# Patient Record
Sex: FEMALE | Race: WHITE | Employment: FULL TIME | URBAN - METROPOLITAN AREA
[De-identification: names, ages, dates, MRNs, and addresses within clinical notes are randomized per-mention and may not be internally consistent; named-entity substitution may affect disease eponyms.]

---

## 2017-09-18 ENCOUNTER — HOSPITAL ENCOUNTER (EMERGENCY)
Age: 42
Discharge: HOME | End: 2017-09-18
Attending: EMERGENCY MEDICINE
Payer: COMMERCIAL

## 2017-09-18 VITALS
BODY MASS INDEX: 40.04 KG/M2 | DIASTOLIC BLOOD PRESSURE: 88 MMHG | OXYGEN SATURATION: 97 % | HEART RATE: 116 BPM | RESPIRATION RATE: 16 BRPM | SYSTOLIC BLOOD PRESSURE: 153 MMHG | HEIGHT: 63 IN | WEIGHT: 226 LBS | TEMPERATURE: 99.4 F

## 2017-09-18 DIAGNOSIS — B34.9 VIRAL ILLNESS: Primary | ICD-10-CM

## 2017-09-18 LAB
ALBUMIN SERPL-MCNC: 3.6 G/DL (ref 3.5–5)
ALBUMIN SERPL-MCNC: 3.7 G/DL (ref 3.5–5)
ALBUMIN/GLOB SERPL: 0.8 {RATIO} (ref 1.1–2.2)
ALBUMIN/GLOB SERPL: 0.9 {RATIO} (ref 1.1–2.2)
ALP SERPL-CCNC: 80 U/L (ref 45–117)
ALP SERPL-CCNC: 86 U/L (ref 45–117)
ALT SERPL-CCNC: 22 U/L (ref 12–78)
ALT SERPL-CCNC: 25 U/L (ref 12–78)
ANION GAP SERPL CALC-SCNC: 6 MMOL/L (ref 5–15)
ANION GAP SERPL CALC-SCNC: 9 MMOL/L (ref 5–15)
APPEARANCE UR: CLEAR
AST SERPL-CCNC: 16 U/L (ref 15–37)
AST SERPL-CCNC: ABNORMAL U/L (ref 15–37)
BACTERIA URNS QL MICRO: ABNORMAL /HPF
BASOPHILS # BLD: 0 K/UL (ref 0–0.1)
BASOPHILS NFR BLD: 0 % (ref 0–1)
BILIRUB SERPL-MCNC: 0.5 MG/DL (ref 0.2–1)
BILIRUB SERPL-MCNC: 0.6 MG/DL (ref 0.2–1)
BILIRUB UR QL: NEGATIVE
BUN SERPL-MCNC: 7 MG/DL (ref 6–20)
BUN SERPL-MCNC: 8 MG/DL (ref 6–20)
BUN/CREAT SERPL: 11 (ref 12–20)
BUN/CREAT SERPL: 9 (ref 12–20)
CALCIUM SERPL-MCNC: 9.1 MG/DL (ref 8.5–10.1)
CALCIUM SERPL-MCNC: 9.5 MG/DL (ref 8.5–10.1)
CHLORIDE SERPL-SCNC: 100 MMOL/L (ref 97–108)
CHLORIDE SERPL-SCNC: 103 MMOL/L (ref 97–108)
CO2 SERPL-SCNC: 24 MMOL/L (ref 21–32)
CO2 SERPL-SCNC: 27 MMOL/L (ref 21–32)
COLOR UR: ABNORMAL
CREAT SERPL-MCNC: 0.76 MG/DL (ref 0.55–1.02)
CREAT SERPL-MCNC: 0.8 MG/DL (ref 0.55–1.02)
DEPRECATED S PYO AG THROAT QL EIA: NEGATIVE
EOSINOPHIL # BLD: 0.2 K/UL (ref 0–0.4)
EOSINOPHIL NFR BLD: 1 % (ref 0–7)
EPITH CASTS URNS QL MICRO: ABNORMAL /LPF
ERYTHROCYTE [DISTWIDTH] IN BLOOD BY AUTOMATED COUNT: 13.4 % (ref 11.5–14.5)
FLUAV AG NPH QL IA: NEGATIVE
FLUBV AG NOSE QL IA: NEGATIVE
GLOBULIN SER CALC-MCNC: 4 G/DL (ref 2–4)
GLOBULIN SER CALC-MCNC: 4.6 G/DL (ref 2–4)
GLUCOSE BLD STRIP.AUTO-MCNC: 174 MG/DL (ref 65–100)
GLUCOSE SERPL-MCNC: 178 MG/DL (ref 65–100)
GLUCOSE SERPL-MCNC: 178 MG/DL (ref 65–100)
GLUCOSE UR STRIP.AUTO-MCNC: >1000 MG/DL
HCT VFR BLD AUTO: 36.8 % (ref 35–47)
HGB BLD-MCNC: 12.9 G/DL (ref 11.5–16)
HGB UR QL STRIP: NEGATIVE
HYALINE CASTS URNS QL MICRO: ABNORMAL /LPF (ref 0–5)
KETONES UR QL STRIP.AUTO: NEGATIVE MG/DL
LEUKOCYTE ESTERASE UR QL STRIP.AUTO: NEGATIVE
LYMPHOCYTES # BLD: 2.1 K/UL (ref 0.8–3.5)
LYMPHOCYTES NFR BLD: 15 % (ref 12–49)
MCH RBC QN AUTO: 28.8 PG (ref 26–34)
MCHC RBC AUTO-ENTMCNC: 35.1 G/DL (ref 30–36.5)
MCV RBC AUTO: 82.1 FL (ref 80–99)
MONOCYTES # BLD: 0.8 K/UL (ref 0–1)
MONOCYTES NFR BLD: 6 % (ref 5–13)
NEUTS SEG # BLD: 11.1 K/UL (ref 1.8–8)
NEUTS SEG NFR BLD: 78 % (ref 32–75)
NITRITE UR QL STRIP.AUTO: NEGATIVE
PH UR STRIP: 5.5 [PH] (ref 5–8)
PLATELET # BLD AUTO: 227 K/UL (ref 150–400)
POTASSIUM SERPL-SCNC: 3.5 MMOL/L (ref 3.5–5.1)
POTASSIUM SERPL-SCNC: ABNORMAL MMOL/L (ref 3.5–5.1)
PROT SERPL-MCNC: 7.6 G/DL (ref 6.4–8.2)
PROT SERPL-MCNC: 8.3 G/DL (ref 6.4–8.2)
PROT UR STRIP-MCNC: NEGATIVE MG/DL
RBC # BLD AUTO: 4.48 M/UL (ref 3.8–5.2)
RBC #/AREA URNS HPF: ABNORMAL /HPF (ref 0–5)
SERVICE CMNT-IMP: ABNORMAL
SODIUM SERPL-SCNC: 133 MMOL/L (ref 136–145)
SODIUM SERPL-SCNC: 136 MMOL/L (ref 136–145)
SP GR UR REFRACTOMETRY: 1.02 (ref 1–1.03)
UROBILINOGEN UR QL STRIP.AUTO: 0.2 EU/DL (ref 0.2–1)
WBC # BLD AUTO: 14.2 K/UL (ref 3.6–11)
WBC URNS QL MICRO: ABNORMAL /HPF (ref 0–4)

## 2017-09-18 PROCEDURE — 36415 COLL VENOUS BLD VENIPUNCTURE: CPT | Performed by: PHYSICIAN ASSISTANT

## 2017-09-18 PROCEDURE — 80053 COMPREHEN METABOLIC PANEL: CPT | Performed by: EMERGENCY MEDICINE

## 2017-09-18 PROCEDURE — 87880 STREP A ASSAY W/OPTIC: CPT | Performed by: PHYSICIAN ASSISTANT

## 2017-09-18 PROCEDURE — 87070 CULTURE OTHR SPECIMN AEROBIC: CPT | Performed by: EMERGENCY MEDICINE

## 2017-09-18 PROCEDURE — 85025 COMPLETE CBC W/AUTO DIFF WBC: CPT | Performed by: EMERGENCY MEDICINE

## 2017-09-18 PROCEDURE — 96374 THER/PROPH/DIAG INJ IV PUSH: CPT

## 2017-09-18 PROCEDURE — 82962 GLUCOSE BLOOD TEST: CPT

## 2017-09-18 PROCEDURE — 87147 CULTURE TYPE IMMUNOLOGIC: CPT | Performed by: EMERGENCY MEDICINE

## 2017-09-18 PROCEDURE — 87804 INFLUENZA ASSAY W/OPTIC: CPT | Performed by: PHYSICIAN ASSISTANT

## 2017-09-18 PROCEDURE — 96361 HYDRATE IV INFUSION ADD-ON: CPT

## 2017-09-18 PROCEDURE — 74011250636 HC RX REV CODE- 250/636: Performed by: PHYSICIAN ASSISTANT

## 2017-09-18 PROCEDURE — 80053 COMPREHEN METABOLIC PANEL: CPT | Performed by: PHYSICIAN ASSISTANT

## 2017-09-18 PROCEDURE — 81001 URINALYSIS AUTO W/SCOPE: CPT | Performed by: PHYSICIAN ASSISTANT

## 2017-09-18 PROCEDURE — 99284 EMERGENCY DEPT VISIT MOD MDM: CPT

## 2017-09-18 RX ORDER — KETOROLAC TROMETHAMINE 30 MG/ML
30 INJECTION, SOLUTION INTRAMUSCULAR; INTRAVENOUS
Status: COMPLETED | OUTPATIENT
Start: 2017-09-18 | End: 2017-09-18

## 2017-09-18 RX ADMIN — SODIUM CHLORIDE 1000 ML: 900 INJECTION, SOLUTION INTRAVENOUS at 17:51

## 2017-09-18 RX ADMIN — KETOROLAC TROMETHAMINE 30 MG: 30 INJECTION, SOLUTION INTRAMUSCULAR at 17:51

## 2017-09-18 NOTE — ED PROVIDER NOTES
HPI Comments: Jorden Rice is a 38 y.o. female  who presents by private vehicle to ER with c/o Patient presents with:  Sore Throat  Chills  Patient presents with sore throat, fever and diarrhea starting Saturday. Patient has been taking tylenol regularly and nyquil. Patient reports mild cough. Denies any sick contacts. She specifically denies any  nausea, vomiting, chest pain, shortness of breath, headache, rash, diarrhea, abdominal pain, urinary/bowel changes, sweating or weight loss. PCP: Palak Mejias MD   PMHx significant for: Past Medical History:  3/19/2012: Gestational diabetes  No date: Headache  12/14/2012: History of gestational diabetes  12/14/2012: HTN (hypertension)  12/14/2012: Obesity  No date: Ovarian cyst   PSHx significant for: Past Surgical History:  No date: APPENDECTOMY  No date: HX CHOLECYSTECTOMY  No date: HX TUBAL LIGATION  Social Hx: Tobacco use: Smoking status: Never Smoker                                                              Smokeless status: Never Used                      ; EtOH use: The patient states she drinks ocassionally per week.; Illicit Drug use: Allergies:  No Known Allergies    There are no other complaints, changes or physical findings at this time. Patient is a 38 y.o. female presenting with sore throat and chills. The history is provided by the patient. Sore Throat    This is a new problem. The current episode started 2 days ago. The problem has not changed since onset. Patient reports a subjective fever - was not measured. Associated symptoms include cough. She has had no exposure to strep or mono. She has tried acetaminophen for the symptoms. The treatment provided mild relief. Chills    Associated symptoms include sore throat and cough.         Past Medical History:   Diagnosis Date    Gestational diabetes 3/19/2012    Headache     History of gestational diabetes 12/14/2012    HTN (hypertension) 12/14/2012    Obesity 12/14/2012    Ovarian cyst        Past Surgical History:   Procedure Laterality Date    APPENDECTOMY      HX CHOLECYSTECTOMY      HX TUBAL LIGATION           Family History:   Problem Relation Age of Onset    Hypertension Mother     Diabetes Mother     Diabetes Daughter      type I    COPD Father        Social History     Social History    Marital status: SINGLE     Spouse name: N/A    Number of children: N/A    Years of education: N/A     Occupational History    Not on file. Social History Main Topics    Smoking status: Never Smoker    Smokeless tobacco: Never Used    Alcohol use Yes      Comment: ocassional    Drug use: No    Sexual activity: Yes     Other Topics Concern    Not on file     Social History Narrative    No narrative on file         ALLERGIES: Review of patient's allergies indicates no known allergies. Review of Systems   Constitutional: Positive for chills. HENT: Positive for sore throat. Eyes: Negative. Respiratory: Positive for cough. Cardiovascular: Negative. Gastrointestinal: Negative. Endocrine: Negative. Genitourinary: Negative. Musculoskeletal: Negative. Skin: Negative. Allergic/Immunologic: Negative. Neurological: Negative. Hematological: Negative. Psychiatric/Behavioral: Negative. All other systems reviewed and are negative. Vitals:    09/18/17 1715   BP: 189/90   Pulse: (!) 124   Resp: 20   Temp: 99.4 °F (37.4 °C)   SpO2: 97%   Weight: 102.5 kg (226 lb)   Height: 5' 3\" (1.6 m)            Physical Exam   Constitutional: She is oriented to person, place, and time. She appears well-developed and well-nourished. Non-toxic appearance. She does not have a sickly appearance. She does not appear ill. No distress. Patient is well appearing and in no acute distress. Patient is non-toxic appearing. Patient ambulated to exam room with out difficulty. HENT:   Head: Normocephalic and atraumatic.    Right Ear: Hearing, tympanic membrane, external ear and ear canal normal.   Left Ear: Hearing, tympanic membrane, external ear and ear canal normal.   Nose: No rhinorrhea. Mouth/Throat: Posterior oropharyngeal erythema present. No oropharyngeal exudate. Eyes: Conjunctivae and EOM are normal. Pupils are equal, round, and reactive to light. Right eye exhibits no discharge. Left eye exhibits no discharge. No scleral icterus. Neck: Normal range of motion. No tracheal deviation present. No thyromegaly present. Cardiovascular: Regular rhythm and normal heart sounds. Tachycardia present. No murmur heard. Pulmonary/Chest: Effort normal and breath sounds normal. No respiratory distress. She has no decreased breath sounds. She has no wheezes. She has no rales. She exhibits no tenderness. Abdominal: Soft. Bowel sounds are normal. She exhibits no distension. There is no tenderness. There is no rebound and no guarding. Musculoskeletal: Normal range of motion. She exhibits no edema or tenderness. Lymphadenopathy:     She has no cervical adenopathy. Neurological: She is alert and oriented to person, place, and time. No cranial nerve deficit. Coordination normal.   Skin: Skin is warm. No erythema. Psychiatric: She has a normal mood and affect. Her behavior is normal. Judgment and thought content normal.   Nursing note and vitals reviewed. MDM  Number of Diagnoses or Management Options  Viral illness:   Diagnosis management comments: Assesment/Plan- 38year old female with sore throat. Differential includes strep, flu, viral illness. Labs reviewed with leukocytosis. Negative rapid strep and rapid flu. Patient given IVF in ED and feeling some improvement. Patient discharged with PCP follow up. Patient educated on reasons to return to the ED.        Amount and/or Complexity of Data Reviewed  Clinical lab tests: ordered and reviewed  Tests in the medicine section of CPT®: ordered and reviewed  Discuss the patient with other providers: yes (Attending- Dr. Alberto Soria also saw patient and agrees with plan. )      ED Course       Procedures

## 2017-09-18 NOTE — DISCHARGE INSTRUCTIONS

## 2017-09-20 LAB
BACTERIA SPEC CULT: ABNORMAL
BACTERIA SPEC CULT: ABNORMAL
SERVICE CMNT-IMP: ABNORMAL

## 2017-09-20 RX ORDER — CEFDINIR 300 MG/1
300 CAPSULE ORAL 2 TIMES DAILY
Qty: 20 CAP | Refills: 0 | Status: SHIPPED | OUTPATIENT
Start: 2017-09-20

## 2019-02-21 ENCOUNTER — HOSPITAL ENCOUNTER (EMERGENCY)
Age: 44
Discharge: HOME | End: 2019-02-21
Attending: EMERGENCY MEDICINE
Payer: COMMERCIAL

## 2019-02-21 ENCOUNTER — APPOINTMENT (OUTPATIENT)
Dept: GENERAL RADIOLOGY | Age: 44
End: 2019-02-21
Attending: EMERGENCY MEDICINE
Payer: COMMERCIAL

## 2019-02-21 VITALS
WEIGHT: 226 LBS | HEART RATE: 103 BPM | DIASTOLIC BLOOD PRESSURE: 106 MMHG | RESPIRATION RATE: 14 BRPM | BODY MASS INDEX: 40.03 KG/M2 | OXYGEN SATURATION: 97 % | TEMPERATURE: 98.8 F | SYSTOLIC BLOOD PRESSURE: 183 MMHG

## 2019-02-21 DIAGNOSIS — M25.571 ACUTE RIGHT ANKLE PAIN: Primary | ICD-10-CM

## 2019-02-21 PROCEDURE — 74011250637 HC RX REV CODE- 250/637: Performed by: EMERGENCY MEDICINE

## 2019-02-21 PROCEDURE — 73610 X-RAY EXAM OF ANKLE: CPT

## 2019-02-21 PROCEDURE — 99283 EMERGENCY DEPT VISIT LOW MDM: CPT

## 2019-02-21 RX ORDER — NAPROXEN 500 MG/1
500 TABLET ORAL 2 TIMES DAILY WITH MEALS
Qty: 20 TAB | Refills: 0 | Status: SHIPPED | OUTPATIENT
Start: 2019-02-21 | End: 2019-03-03

## 2019-02-21 RX ORDER — HYDROCODONE BITARTRATE AND ACETAMINOPHEN 5; 325 MG/1; MG/1
1 TABLET ORAL
Qty: 6 TAB | Refills: 0 | Status: SHIPPED | OUTPATIENT
Start: 2019-02-21

## 2019-02-21 RX ORDER — NAPROXEN 250 MG/1
500 TABLET ORAL
Status: COMPLETED | OUTPATIENT
Start: 2019-02-21 | End: 2019-02-21

## 2019-02-21 RX ADMIN — NAPROXEN 500 MG: 250 TABLET ORAL at 18:30

## 2019-02-21 NOTE — ED PROVIDER NOTES
53 y.o. female with past medical history significant for HTN, obesity, gestational DM, ovarian cyst, who presents ambulatory to the ED with cc of ankle pain. Pt reports she woke with 10/10 right ankle pain yesterday morning. She states she is ambulatory with pain. She endorses use of Advil 800mg at ~9:00AM. Pt denies known injury or fall. She denies history of kidney problems. She specifically denies any redness, ankle swelling, leg swelling, appetite change, diet change, fevers, chills, nausea, vomiting, urinary changes, bowel movement changes. There are no other acute medical concerns at this time. Social Hx: Never Tobacco use, occasional EtOH use, denies Illicit Drug use PCP: Es Andrea MD 
 
Note written by Maisha Maier, as dictated by Meenu Sims MD 6:24 PM 
 
 
 
The history is provided by the patient. No  was used. Past Medical History:  
Diagnosis Date  Gestational diabetes 3/19/2012  Headache  History of gestational diabetes 12/14/2012  
 HTN (hypertension) 12/14/2012  Obesity 12/14/2012  Ovarian cyst   
 
 
Past Surgical History:  
Procedure Laterality Date  APPENDECTOMY  HX CHOLECYSTECTOMY  HX TUBAL LIGATION Family History:  
Problem Relation Age of Onset  Hypertension Mother  Diabetes Mother  Diabetes Daughter   
     type I  
 COPD Father Social History Socioeconomic History  Marital status: SINGLE Spouse name: Not on file  Number of children: Not on file  Years of education: Not on file  Highest education level: Not on file Social Needs  Financial resource strain: Not on file  Food insecurity - worry: Not on file  Food insecurity - inability: Not on file  Transportation needs - medical: Not on file  Transportation needs - non-medical: Not on file Occupational History  Not on file Tobacco Use  Smoking status: Never Smoker  Smokeless tobacco: Never Used Substance and Sexual Activity  Alcohol use: Yes Comment: ocassional  
 Drug use: No  
 Sexual activity: Yes Other Topics Concern  Not on file Social History Narrative  Not on file ALLERGIES: Patient has no known allergies. Review of Systems Constitutional: Negative for appetite change, chills and fever. HENT: Negative for ear pain and sore throat. Eyes: Negative for pain. Respiratory: Negative for chest tightness and shortness of breath. Cardiovascular: Negative for chest pain and leg swelling. Gastrointestinal: Negative for abdominal pain, blood in stool, constipation, diarrhea, nausea and vomiting. Genitourinary: Negative for decreased urine volume, difficulty urinating, dysuria, flank pain, frequency, hematuria and urgency. Musculoskeletal: Positive for arthralgias. Negative for back pain. Skin: Negative for color change and rash. Neurological: Negative for headaches. All other systems reviewed and are negative. Vitals:  
 02/21/19 1826 BP: (!) 183/106 Pulse: (!) 103 Resp: 14 Temp: 98.8 °F (37.1 °C) SpO2: 97% Weight: 102.5 kg (226 lb) Physical Exam  
Constitutional: No distress. Obese HENT:  
Head: Normocephalic and atraumatic. Mouth/Throat: Oropharynx is clear and moist.  
Eyes: Conjunctivae are normal. Pupils are equal, round, and reactive to light. No scleral icterus. Neck: Neck supple. No tracheal deviation present. Cardiovascular: Normal rate, regular rhythm and intact distal pulses. Pulses intact Pulmonary/Chest: Effort normal. No respiratory distress. She has no wheezes. She has no rales. Abdominal: Soft. She exhibits no distension. There is no tenderness. Genitourinary:  
Genitourinary Comments: deferred Musculoskeletal: She exhibits tenderness (with flexion and extension of right ankle). She exhibits no edema or deformity. no bony tenderness Neurological: She is alert. Normal motor and sensation Skin: Skin is warm and dry. Psychiatric: She has a normal mood and affect. Nursing note and vitals reviewed. Note written by Maisha Enriquez, as dictated by Chelsi Ngo MD 6:24 PM 
 
MDM Number of Diagnoses or Management Options Acute right ankle pain:  
Diagnosis management comments: 53 y.o. female with past medical history significant for obesity, htn presents with ankle pain Differential diagnosis includes gout, sprain, arthritis. No signs of celllulitis or septic arthritis  
- NSAIDS 
- Norco for breakthrough pain - Follow up with PCP 
- Given return precautions Rubens Horan. Tereso Pat MD 
 
 
  
 
Procedures

## 2019-02-22 NOTE — DISCHARGE INSTRUCTIONS

## 2023-01-15 ENCOUNTER — HOSPITAL ENCOUNTER (EMERGENCY)
Age: 48
Discharge: HOME | End: 2023-01-15
Attending: STUDENT IN AN ORGANIZED HEALTH CARE EDUCATION/TRAINING PROGRAM
Payer: COMMERCIAL

## 2023-01-15 ENCOUNTER — APPOINTMENT (OUTPATIENT)
Dept: CT IMAGING | Age: 48
End: 2023-01-15
Payer: COMMERCIAL

## 2023-01-15 VITALS
HEIGHT: 63 IN | WEIGHT: 220 LBS | RESPIRATION RATE: 16 BRPM | BODY MASS INDEX: 38.98 KG/M2 | OXYGEN SATURATION: 94 % | HEART RATE: 93 BPM | TEMPERATURE: 98.6 F | DIASTOLIC BLOOD PRESSURE: 82 MMHG | SYSTOLIC BLOOD PRESSURE: 148 MMHG

## 2023-01-15 DIAGNOSIS — K57.92 DIVERTICULITIS: Primary | ICD-10-CM

## 2023-01-15 DIAGNOSIS — N28.89 LEFT RENAL MASS: ICD-10-CM

## 2023-01-15 LAB
ALBUMIN SERPL-MCNC: 3.8 G/DL (ref 3.5–5)
ALBUMIN/GLOB SERPL: 0.9 (ref 1.1–2.2)
ALP SERPL-CCNC: 66 U/L (ref 45–117)
ALT SERPL-CCNC: 31 U/L (ref 12–78)
ANION GAP SERPL CALC-SCNC: 10 MMOL/L (ref 5–15)
APPEARANCE UR: CLEAR
AST SERPL-CCNC: 17 U/L (ref 15–37)
BACTERIA URNS QL MICRO: NEGATIVE /HPF
BASOPHILS # BLD: 0.1 K/UL (ref 0–0.1)
BASOPHILS NFR BLD: 0 % (ref 0–1)
BILIRUB SERPL-MCNC: 1.1 MG/DL (ref 0.2–1)
BILIRUB UR QL: NEGATIVE
BUN SERPL-MCNC: 10 MG/DL (ref 6–20)
BUN/CREAT SERPL: 10 (ref 12–20)
CALCIUM SERPL-MCNC: 9.5 MG/DL (ref 8.5–10.1)
CHLORIDE SERPL-SCNC: 101 MMOL/L (ref 97–108)
CO2 SERPL-SCNC: 25 MMOL/L (ref 21–32)
COLOR UR: ABNORMAL
COMMENT, HOLDF: NORMAL
CREAT SERPL-MCNC: 0.96 MG/DL (ref 0.55–1.02)
DIFFERENTIAL METHOD BLD: ABNORMAL
EOSINOPHIL # BLD: 0.2 K/UL (ref 0–0.4)
EOSINOPHIL NFR BLD: 1 % (ref 0–7)
EPITH CASTS URNS QL MICRO: ABNORMAL /LPF
ERYTHROCYTE [DISTWIDTH] IN BLOOD BY AUTOMATED COUNT: 13.3 % (ref 11.5–14.5)
GLOBULIN SER CALC-MCNC: 4.3 G/DL (ref 2–4)
GLUCOSE SERPL-MCNC: 247 MG/DL (ref 65–100)
GLUCOSE UR STRIP.AUTO-MCNC: 500 MG/DL
HCT VFR BLD AUTO: 40.9 % (ref 35–47)
HGB BLD-MCNC: 13.9 G/DL (ref 11.5–16)
HGB UR QL STRIP: NEGATIVE
IMM GRANULOCYTES # BLD AUTO: 0.1 K/UL (ref 0–0.04)
IMM GRANULOCYTES NFR BLD AUTO: 1 % (ref 0–0.5)
KETONES UR QL STRIP.AUTO: ABNORMAL MG/DL
LACTATE SERPL-SCNC: 1 MMOL/L (ref 0.4–2)
LEUKOCYTE ESTERASE UR QL STRIP.AUTO: ABNORMAL
LIPASE SERPL-CCNC: 267 U/L (ref 73–393)
LYMPHOCYTES # BLD: 3.2 K/UL (ref 0.8–3.5)
LYMPHOCYTES NFR BLD: 17 % (ref 12–49)
MCH RBC QN AUTO: 29 PG (ref 26–34)
MCHC RBC AUTO-ENTMCNC: 34 G/DL (ref 30–36.5)
MCV RBC AUTO: 85.4 FL (ref 80–99)
MONOCYTES # BLD: 0.8 K/UL (ref 0–1)
MONOCYTES NFR BLD: 4 % (ref 5–13)
NEUTS SEG # BLD: 14.4 K/UL (ref 1.8–8)
NEUTS SEG NFR BLD: 77 % (ref 32–75)
NITRITE UR QL STRIP.AUTO: NEGATIVE
NRBC # BLD: 0 K/UL (ref 0–0.01)
NRBC BLD-RTO: 0 PER 100 WBC
PH UR STRIP: 6 (ref 5–8)
PLATELET # BLD AUTO: 263 K/UL (ref 150–400)
PMV BLD AUTO: 10.2 FL (ref 8.9–12.9)
POTASSIUM SERPL-SCNC: 3.9 MMOL/L (ref 3.5–5.1)
PROT SERPL-MCNC: 8.1 G/DL (ref 6.4–8.2)
PROT UR STRIP-MCNC: ABNORMAL MG/DL
RBC # BLD AUTO: 4.79 M/UL (ref 3.8–5.2)
RBC #/AREA URNS HPF: ABNORMAL /HPF (ref 0–5)
SAMPLES BEING HELD,HOLD: NORMAL
SODIUM SERPL-SCNC: 136 MMOL/L (ref 136–145)
SP GR UR REFRACTOMETRY: 1.02 (ref 1–1.03)
UROBILINOGEN UR QL STRIP.AUTO: 0.2 EU/DL (ref 0.2–1)
WBC # BLD AUTO: 18.8 K/UL (ref 3.6–11)
WBC URNS QL MICRO: ABNORMAL /HPF (ref 0–4)

## 2023-01-15 PROCEDURE — 96375 TX/PRO/DX INJ NEW DRUG ADDON: CPT

## 2023-01-15 PROCEDURE — 85025 COMPLETE CBC W/AUTO DIFF WBC: CPT

## 2023-01-15 PROCEDURE — 36415 COLL VENOUS BLD VENIPUNCTURE: CPT

## 2023-01-15 PROCEDURE — 99285 EMERGENCY DEPT VISIT HI MDM: CPT

## 2023-01-15 PROCEDURE — 81001 URINALYSIS AUTO W/SCOPE: CPT

## 2023-01-15 PROCEDURE — 74011000258 HC RX REV CODE- 258

## 2023-01-15 PROCEDURE — 83690 ASSAY OF LIPASE: CPT

## 2023-01-15 PROCEDURE — 96365 THER/PROPH/DIAG IV INF INIT: CPT

## 2023-01-15 PROCEDURE — 87040 BLOOD CULTURE FOR BACTERIA: CPT

## 2023-01-15 PROCEDURE — 96361 HYDRATE IV INFUSION ADD-ON: CPT

## 2023-01-15 PROCEDURE — 74011250636 HC RX REV CODE- 250/636

## 2023-01-15 PROCEDURE — 83605 ASSAY OF LACTIC ACID: CPT

## 2023-01-15 PROCEDURE — 74011000636 HC RX REV CODE- 636: Performed by: RADIOLOGY

## 2023-01-15 PROCEDURE — 74177 CT ABD & PELVIS W/CONTRAST: CPT

## 2023-01-15 PROCEDURE — 80053 COMPREHEN METABOLIC PANEL: CPT

## 2023-01-15 RX ORDER — KETOROLAC TROMETHAMINE 30 MG/ML
30 INJECTION, SOLUTION INTRAMUSCULAR; INTRAVENOUS
Status: COMPLETED | OUTPATIENT
Start: 2023-01-15 | End: 2023-01-15

## 2023-01-15 RX ORDER — MORPHINE SULFATE 4 MG/ML
4 INJECTION INTRAVENOUS
Status: COMPLETED | OUTPATIENT
Start: 2023-01-15 | End: 2023-01-15

## 2023-01-15 RX ORDER — AMOXICILLIN AND CLAVULANATE POTASSIUM 875; 125 MG/1; MG/1
1 TABLET, FILM COATED ORAL 2 TIMES DAILY
Qty: 14 TABLET | Refills: 0 | Status: SHIPPED | OUTPATIENT
Start: 2023-01-15 | End: 2023-01-22

## 2023-01-15 RX ADMIN — KETOROLAC TROMETHAMINE 30 MG: 30 INJECTION, SOLUTION INTRAMUSCULAR at 10:31

## 2023-01-15 RX ADMIN — MORPHINE SULFATE 4 MG: 4 INJECTION INTRAVENOUS at 13:28

## 2023-01-15 RX ADMIN — SODIUM CHLORIDE 1000 ML: 9 INJECTION, SOLUTION INTRAVENOUS at 11:12

## 2023-01-15 RX ADMIN — PIPERACILLIN AND TAZOBACTAM 4.5 G: 4; .5 INJECTION, POWDER, LYOPHILIZED, FOR SOLUTION INTRAVENOUS at 12:59

## 2023-01-15 RX ADMIN — IOPAMIDOL 100 ML: 755 INJECTION, SOLUTION INTRAVENOUS at 11:01

## 2023-01-15 NOTE — Clinical Note
[address]  [facility] EMERGENCY DEPT  [id number] [phone]    Work/School Note    Date: 1/15/2023    To Whom It May concern:    Beverley Klein was seen and treated today in the emergency room by the following provider(s):  Attending Provider: Pantera Andrew MD  Nurse Practitioner: Dakota Rollins. Beverley Klein is excused from work/school on 01/15/23 and 01/16/23. She is medically clear to return to work/school on 1/17/2023.        Sincerely,          Boom Herrera RN

## 2023-01-15 NOTE — Clinical Note
[address]  [facility] EMERG[facility]Y DEPT  [address] [id number]  [phone]    Work/School Note    Date: 1/15/2023    To Whom It May concern:    Shahram Becerra was seen and treated today in the emergency room by the following provider(s):  Attending Provider: George Bravo MD  Nurse Practitioner: Tiffanie Zamora Shahram Becerra is excused from work/school on 01/15/23 and 01/16/23. She is medically clear to return to work/school on 1/17/2023.        Sincerely,          Trino Morales

## 2023-01-15 NOTE — DISCHARGE INSTRUCTIONS
Please come back to the emergency department if you are unable to keep down fluids, new fever, chills, vomiting blood, bloody stool, or any other concerns. Follow-up with your primary care provider in 2 days for the diverticulitis and the renal mass. Result of CT:     \"1. Mild proximal sigmoid diverticulitis. No associated fluid collection. 2. 2.1 cm x 2.7 cm inhomogeneous, but predominantly isodense mass within the  central left kidney. .. Recommend dedicated renal mass CT or MR to confirm  enhancement characteristics. \"    Take ibuprofen and tylenol as needed for pain.  Clear liquid diet to allow the intestines to heal.

## 2023-01-15 NOTE — ED TRIAGE NOTES
Pt reports lower abdominal with worsening pain to left lower abdomen. Pt also reports headache at this time. Last normal BM on 1/12/23 constipation over last several days. Pt denies nausea and vomiting. Pt denies vision changes with headache. Took tylenol for pain without relief. Denies urinary symptoms.

## 2023-01-15 NOTE — ED PROVIDER NOTES
Zaki Cope is a 50 y.o. female w/ hx ovarian cyst, total hysterectomy, HTN, migraine HA, appendectomy, and cholecystectomy presents to the ED c/o bilateral suprapubic pain since Thursday, Jan 12 th that increases w/ movement. Pt reports the left lower abdomen is worse than right. Pt reports the pain feels like her \"ovarian cysts\" w/ constant abdominal pain but also intermittent abdominal cramping. Pt reports pain \"feels like gas\". Pt reports taking Tums, pepto bismal, tylenol, ibuprofen, and ex-lax w/o relief. Pt also reports migraine headache that is similar to previous migraine headaches. Pt reports \"I haven't been able to sleep and I think all this is causing my headache\". Pt reports small BM today morning but had a complete relief BM on Saturday, Jan 14 th. Pt reports \"I'm only SOB from the pain\". When asked about elevated HR, pt reports normal HR is 120 s but has never been assessed for elevated HR. Denies dysuria, diarrhea, nausea, vomiting, recent sick contacts, back pain, chest pain, vaginal bleeding, vision changes. Admits to occasional ETOH use, denies illicit drug use, denies smoking, denies vape, denies IVDU. The history is provided by the patient. No  was used. Abdominal Pain   Associated symptoms include headaches. Pertinent negatives include no fever, no diarrhea, no nausea, no vomiting, no dysuria and no chest pain. Headache   Associated symptoms include shortness of breath. Pertinent negatives include no fever, no nausea and no vomiting.       Past Medical History:   Diagnosis Date    Gestational diabetes 3/19/2012    Headache(784.0)     History of gestational diabetes 12/14/2012    HTN (hypertension) 12/14/2012    Obesity 12/14/2012    Ovarian cyst        Past Surgical History:   Procedure Laterality Date    HX CHOLECYSTECTOMY      HX TUBAL LIGATION      AK APPENDECTOMY           Family History:   Problem Relation Age of Onset    Hypertension Mother Diabetes Mother     Diabetes Daughter         type I    COPD Father        Social History     Socioeconomic History    Marital status: SINGLE     Spouse name: Not on file    Number of children: Not on file    Years of education: Not on file    Highest education level: Not on file   Occupational History    Not on file   Tobacco Use    Smoking status: Never    Smokeless tobacco: Never   Substance and Sexual Activity    Alcohol use: Yes     Comment: ocassional    Drug use: No    Sexual activity: Yes   Other Topics Concern    Not on file   Social History Narrative    Not on file     Social Determinants of Health     Financial Resource Strain: Not on file   Food Insecurity: Not on file   Transportation Needs: Not on file   Physical Activity: Not on file   Stress: Not on file   Social Connections: Not on file   Intimate Partner Violence: Not on file   Housing Stability: Not on file         ALLERGIES: Patient has no known allergies. Review of Systems   Constitutional:  Negative for activity change, appetite change, chills and fever. Respiratory:  Positive for shortness of breath. Cardiovascular:  Negative for chest pain. Gastrointestinal:  Positive for abdominal pain. Negative for diarrhea, nausea and vomiting. Genitourinary:  Negative for dysuria, flank pain and vaginal pain. Skin:  Negative for rash. Neurological:  Positive for headaches. Vitals:    01/15/23 0944 01/15/23 1003   BP: (!) 181/80 (!) 145/92   Pulse: (!) 128    Resp: 16    Temp: 98.6 °F (37 °C)    SpO2: 96% 95%   Weight: 99.8 kg (220 lb)    Height: 5' 3\" (1.6 m)             Physical Exam  Vitals reviewed. Constitutional:       General: She is not in acute distress. Appearance: Normal appearance. She is normal weight. HENT:      Head: Normocephalic. Nose: No rhinorrhea. Eyes:      Conjunctiva/sclera: Conjunctivae normal.   Cardiovascular:      Rate and Rhythm: Regular rhythm. Tachycardia present.    Pulmonary:      Effort: No respiratory distress. Breath sounds: Normal breath sounds. No wheezing or rhonchi. Abdominal:      General: Abdomen is flat. Bowel sounds are normal.      Palpations: Abdomen is soft. Tenderness: There is abdominal tenderness in the suprapubic area. There is guarding. There is no right CVA tenderness or left CVA tenderness. Negative signs include Gross's sign and McBurney's sign. Musculoskeletal:         General: Normal range of motion. Cervical back: Normal range of motion and neck supple. Skin:     General: Skin is warm and dry. Capillary Refill: Capillary refill takes less than 2 seconds. Neurological:      Mental Status: She is alert and oriented to person, place, and time. Mental status is at baseline. Cranial Nerves: No cranial nerve deficit. Gait: Gait normal.      12:00 PM  Pt updated regarding diverticulitis result on CT. Pt educated about additional labs like blood cultures and lipase. Pt informed she will receive IV antibiotic called Zosyn. Pt also educated about the incidental finding of the renal mass. 01:10 PM  Pt reports pain is returning. Pt encouraged not to drive if able to be discharged and given morphine. Pt states understanding and states will have mother pick her up.     1:52 PM  Pt reports pain has improved and feels okay to be discharged home. Pt educated that a repeat CT is not recommended at this time since she was given IV contrast for previous CT. Pt encouraged to follow-up with PCP regarding renal mass. Pt states understanding. Medical Decision Making  Amount and/or Complexity of Data Reviewed  Labs: ordered. Radiology: ordered. Risk  Prescription drug management. Pt is a 49 y/o female presenting to ED c/o bilateral lower abdominal pain X 3-4 days. Doubt pancreatitis given lipase is 267. Doubt ovarian torsion because pt reports she had total hysterectomy w/ ovaries removed.  Ct showed \"mild proximal sigmoid diverticulitis\" but \"no associated fluid collection\". Incidental finding of \"mass within the central left kidney\" was also noted on CT. Pt was given a dose of IV Zosyn while in ED. Pt received toradol and morphine to help with pain. Pt reports able to tolerated fluids w/o issues. Pt is table for discharge home on Augmentin. Pt encouraged to follow-up with PCP regarding diverticulitis and the renal mass in 2 days. Strict return to ED precautions given. ACI discussed with the patient. Patient verbalized understanding. Procedures    N/A    LABORATORY TESTS:  Recent Results (from the past 12 hour(s))   SAMPLES BEING HELD    Collection Time: 01/15/23  9:47 AM   Result Value Ref Range    SAMPLES BEING HELD [email or web address] RED     COMMENT        Add-on orders for these samples will be processed based on acceptable specimen integrity and analyte stability, which may vary by analyte. CBC WITH AUTOMATED DIFF    Collection Time: 01/15/23  9:47 AM   Result Value Ref Range    WBC 18.8 (H) 3.6 - 11.0 K/uL    RBC 4.79 3.80 - 5.20 M/uL    HGB 13.9 11.5 - 16.0 g/dL    HCT 40.9 35.0 - 47.0 %    MCV 85.4 80.0 - 99.0 FL    MCH 29.0 26.0 - 34.0 PG    MCHC 34.0 30.0 - 36.5 g/dL    RDW 13.3 11.5 - 14.5 %    PLATELET 052 585 - 980 K/uL    MPV 10.2 8.9 - 12.9 FL    NRBC 0.0 0  WBC    ABSOLUTE NRBC 0.00 0.00 - 0.01 K/uL    NEUTROPHILS 77 (H) 32 - 75 %    LYMPHOCYTES 17 12 - 49 %    MONOCYTES 4 (L) 5 - 13 %    EOSINOPHILS 1 0 - 7 %    BASOPHILS 0 0 - 1 %    IMMATURE GRANULOCYTES 1 (H) 0.0 - 0.5 %    ABS. NEUTROPHILS 14.4 (H) 1.8 - 8.0 K/UL    ABS. LYMPHOCYTES 3.2 0.8 - 3.5 K/UL    ABS. MONOCYTES 0.8 0.0 - 1.0 K/UL    ABS. EOSINOPHILS 0.2 0.0 - 0.4 K/UL    ABS. BASOPHILS 0.1 0.0 - 0.1 K/UL    ABS. IMM.  GRANS. 0.1 (H) 0.00 - 0.04 K/UL    DF AUTOMATED     METABOLIC PANEL, COMPREHENSIVE    Collection Time: 01/15/23  9:47 AM   Result Value Ref Range    Sodium 136 136 - 145 mmol/L    Potassium 3.9 3.5 - 5.1 mmol/L    Chloride 101 97 - 108 mmol/L    CO2 25 21 - 32 mmol/L    Anion gap 10 5 - 15 mmol/L    Glucose 247 (H) 65 - 100 mg/dL    BUN 10 6 - 20 MG/DL    Creatinine 0.96 0.55 - 1.02 MG/DL    BUN/Creatinine ratio 10 (L) 12 - 20      eGFR >60 >60 ml/min/1.73m2    Calcium 9.5 8.5 - 10.1 MG/DL    Bilirubin, total 1.1 (H) 0.2 - 1.0 MG/DL    ALT (SGPT) 31 12 - 78 U/L    AST (SGOT) 17 15 - 37 U/L    Alk. phosphatase 66 45 - 117 U/L    Protein, total 8.1 6.4 - 8.2 g/dL    Albumin 3.8 3.5 - 5.0 g/dL    Globulin 4.3 (H) 2.0 - 4.0 g/dL    A-G Ratio 0.9 (L) 1.1 - 2.2     LIPASE    Collection Time: 01/15/23  9:47 AM   Result Value Ref Range    Lipase 267 73 - 393 U/L   URINALYSIS W/MICROSCOPIC    Collection Time: 01/15/23 10:44 AM   Result Value Ref Range    Color YELLOW/STRAW      Appearance CLEAR CLEAR      Specific gravity 1.025 1.003 - 1.030      pH (UA) 6.0 5.0 - 8.0      Protein TRACE (A) NEG mg/dL    Glucose 500 (A) NEG mg/dL    Ketone TRACE (A) NEG mg/dL    Bilirubin Negative NEG      Blood Negative NEG      Urobilinogen 0.2 0.2 - 1.0 EU/dL    Nitrites Negative NEG      Leukocyte Esterase SMALL (A) NEG      WBC 0-4 0 - 4 /hpf    RBC 0-5 0 - 5 /hpf    Epithelial cells FEW FEW /lpf    Bacteria Negative NEG /hpf   LACTIC ACID    Collection Time: 01/15/23 12:48 PM   Result Value Ref Range    Lactic acid 1.0 0.4 - 2.0 MMOL/L       IMAGING RESULTS:  CT ABD PELV W CONT   Final Result   1. Mild proximal sigmoid diverticulitis. No associated fluid collection. 2. 2.1 cm x 2.7 cm inhomogeneous, but predominantly isodense mass within the   central left kidney. Finding is highly worrisome for neoplasm, specifically   renal cell carcinoma. Recommend dedicated renal mass CT or MR to confirm   enhancement characteristics.           MEDICATIONS GIVEN:  Medications   ketorolac (TORADOL) injection 30 mg (30 mg IntraVENous Given 1/15/23 1031)   sodium chloride 0.9 % bolus infusion 1,000 mL (0 mL IntraVENous IV Completed 1/15/23 1316)   iopamidoL (ISOVUE-370) 370 mg iodine /mL (76 %) injection 100 mL (100 mL IntraVENous Given 1/15/23 1101)   piperacillin-tazobactam (ZOSYN) 4.5 g in 0.9% sodium chloride (MBP/ADV) 100 mL MBP (0 g IntraVENous IV Completed 1/15/23 1344)   morphine injection 4 mg (4 mg IntraVENous Given 1/15/23 1328)       IMPRESSION:  1. Diverticulitis    2. Left renal mass        PLAN:  1. Current Discharge Medication List        START taking these medications    Details   amoxicillin-clavulanate (Augmentin) 875-125 mg per tablet Take 1 Tablet by mouth two (2) times a day for 7 days. Qty: 14 Tablet, Refills: 0  Start date: 1/15/2023, End date: 2023           STOP taking these medications       cefdinir (OMNICEF) 300 mg capsule Comments:   Reason for Stoppin.   Follow-up Information       Follow up With Specialties Details Why Contact Info    Clementina Malik MD Family Medicine In 2 days  [address]  [phone]      [facility] EMERGENCY DEPT Emergency Medicine  As needed, If symptoms worsen [facility]  [phone]          3.  Return to ED if worse     Signed By: JENNIFER Rdz     January 15, 2023

## 2023-01-15 NOTE — ED NOTES
Pt given discharge instructions per provider and verbalized understanding, pt ambulatory from ED to home with son as  of vehicle.

## 2023-01-15 NOTE — Clinical Note
[address]  [facility] EMERGENCY DEPT  [address] [id number]  [phone]    Work/School Note    Date: 1/15/2023    To Whom It May concern:    Macarena De Anda was seen and treated today in the emergency room by the following provider(s):  Attending Provider: Priscilla Schilling MD  Nurse Practitioner: Ashley Vela. Macarena De Anda is excused from work/school on 01/15/23 and 01/16/23. She is medically clear to return to work/school on 1/17/2023.        Sincerely,          Tai Solano

## 2023-01-20 LAB
BACTERIA SPEC CULT: NORMAL
SERVICE CMNT-IMP: NORMAL

## 2023-02-09 NOTE — PROGRESS NOTES
[facility] [place] [address] [facility] [address] [facility] Barnett Flavors: [phone]  F: [phone]      Reason for Visit:   Stone Soler is a 46 y.o. female who is seen in consultation at the request of Nathaniel Manuel for evaluation of renal mass. Hematology/Oncology Treatment History:   CT A/P 1/15/2023: Mild proximal sigmoid diverticulitis. No associated fluid collection. 2.1 cm x 2.7 cm inhomogeneous, but predominantly isodense mass within the central left kidney. Finding is highly worrisome for neoplasm, specifically renal cell carcinoma. CT abdomen w/wo contrast 2/3/2023: left renal 3.4cm mass suspicious for neoplasm. Fatty liver. History of Present Illness:   Stone Soler is a pleasant 46 y.o. female who was seen for evaluation of left renal mass. She presented to the ED on 1/15/2023 complaining of lower abdominal pain for several days. A CT was done and she was diagnosed with diverticulitis, treated with antibiotics with improvement. On the CT, there was an incidental renal mass concerning for RCC. She followed up with her PC as an outpatient, and she had a repeat CT done at [address] [place] [address] on 2/3/2023 which confirmed the abnormality. She was referred to see me for further evaluation. She is feeling quite a bit better. Diverticulitis has resolved, pain resolved. Done with abx. She notes some mild low back pain, above waist band. No flank pain. No blood in urine. Energy has been fair. She is accompanied by her mother today.       Past Medical History:   Diagnosis Date    Gestational diabetes 3/19/2012    Headache(784.0)     History of gestational diabetes 12/14/2012    HTN (hypertension) 12/14/2012    Obesity 12/14/2012    Ovarian cyst        Past Surgical History:   Procedure Laterality Date    HX CHOLECYSTECTOMY      HX TUBAL LIGATION      AR APPENDECTOMY         Social History     Socioeconomic History    Marital status: SINGLE Spouse name: Not on file    Number of children: Not on file    Years of education: Not on file    Highest education level: Not on file   Occupational History    Not on file   Tobacco Use    Smoking status: Never    Smokeless tobacco: Never   Substance and Sexual Activity    Alcohol use: Yes     Comment: ocassional    Drug use: No    Sexual activity: Yes   Other Topics Concern    Not on file   Social History Narrative    Not on file     Social Determinants of Health     Financial Resource Strain: Not on file   Food Insecurity: Not on file   Transportation Needs: Not on file   Physical Activity: Not on file   Stress: Not on file   Social Connections: Not on file   Intimate Partner Violence: Not on file   Housing Stability: Not on file       Family History   Problem Relation Age of Onset    Hypertension Mother     Diabetes Mother     Diabetes Daughter         type I    COPD Father        Current Outpatient Medications   Medication Sig    hydroCHLOROthiazide (HYDRODIURIL) 25 mg tablet     hydrOXYzine HCL (ATARAX) 25 mg tablet     HYDROcodone-acetaminophen (NORCO) 5-325 mg per tablet Take 1 Tab by mouth every four (4) hours as needed for Pain. Max Daily Amount: 6 Tabs. conjugated estrogens (PREMARIN) 0.625 mg tablet Take 1 Tab by mouth daily. acetaminophen (TYLENOL) 325 mg tablet Take  by mouth every four (4) hours as needed. albuterol (PROVENTIL VENTOLIN) 2.5 mg /3 mL (0.083 %) nebulizer solution 3 mL by Nebulization route every four (4) hours as needed for Wheezing. Nebulizer & Compressor machine 1 Each by Does Not Apply route every four (4) hours as needed. Nebulizer Accessories Kit Updraft and tubing    predniSONE (STERAPRED DS) 10 mg dose pack X 10 days    albuterol (PROVENTIL, VENTOLIN) 90 mcg/actuation inhaler Take 1-2 Puffs by inhalation every four (4) hours as needed for Wheezing. naratriptan (AMERGE) 2.5 mg tablet Take 1 Tab by mouth once as needed for Migraine for 1 dose.  may repeat in 4 hours if needed, maximum of 2 in 24 hours    propranolol LA (INDERAL LA) 80 mg SR capsule Take 1 Cap by mouth daily. No current facility-administered medications for this visit. No Known Allergies       Review of Systems: A complete review of systems was obtained, reviewed. Pertinent findings reviewed above. Physical Exam:   Visit Vitals  BP (!) 160/100 (BP 1 Location: Left upper arm, BP Patient Position: Sitting)   Temp 98.1 °F (36.7 °C) (Temporal)   Resp 16   Ht 5' 3\" (1.6 m)   Wt 222 lb 3.2 oz (100.8 kg)   LMP 06/22/2011   SpO2 95%   BMI 39.36 kg/m²     ECOG PS: 0  General: no distress  Eyes: PERRLA, anicteric sclerae  HENT: atraumatic, oropharynx clear  Neck: supple  Lymphatic: no cervical, supraclavicular, or inguinal adenopathy  Respiratory: CTAB, normal respiratory effort  CV: normal rate, regular rhythm, no murmurs, no peripheral edema  GI: soft, nontender, nondistended, no masses, no hepatomegaly, no splenomegaly  MS: digits without clubbing or cyanosis. Skin: no rashes, no ecchymoses, no petechia. normal temperature, turgor, and texture. Psych: alert, oriented, appropriate affect, normal judgment/insight    Results:     Lab Results   Component Value Date/Time    WBC 18.8 (H) 01/15/2023 09:47 AM    HGB 13.9 01/15/2023 09:47 AM    HCT 40.9 01/15/2023 09:47 AM    PLATELET 177 35/04/4022 09:47 AM    MCV 85.4 01/15/2023 09:47 AM    ABS.  NEUTROPHILS 14.4 (H) 01/15/2023 09:47 AM     Lab Results   Component Value Date/Time    Sodium 136 01/15/2023 09:47 AM    Potassium 3.9 01/15/2023 09:47 AM    Chloride 101 01/15/2023 09:47 AM    CO2 25 01/15/2023 09:47 AM    Glucose 247 (H) 01/15/2023 09:47 AM    BUN 10 01/15/2023 09:47 AM    Creatinine 0.96 01/15/2023 09:47 AM    GFR est AA >60 09/18/2017 06:19 PM    GFR est non-AA >60 09/18/2017 06:19 PM    Calcium 9.5 01/15/2023 09:47 AM    Glucose (POC) 174 (H) 09/18/2017 06:18 PM     Lab Results   Component Value Date/Time    Bilirubin, total 1.1 (H) 01/15/2023 09:47 AM    ALT (SGPT) 31 01/15/2023 09:47 AM    Alk. phosphatase 66 01/15/2023 09:47 AM    Protein, total 8.1 01/15/2023 09:47 AM    Albumin 3.8 01/15/2023 09:47 AM    Globulin 4.3 (H) 01/15/2023 09:47 AM       Records from ED reviewed and summarized above. Test results above have been reviewed. Assessment:   1) Left renal mass  Concerning for RCC. I will obtain CT Chest and MRI abdomen for further staging, and I will refer to urology to discuss potential surgery. If she has surgery, she should return to see me a few weeks after to discuss pathology and determine if any adjuvant therapy is indicate. 2) Diverticulitis  Symptoms resolved after treatment with abx.     Plan:     MRI abdomen  CT Chest  Refer to urology    Signed By: Liz Navarro MD

## 2023-02-10 ENCOUNTER — OFFICE VISIT (OUTPATIENT)
Dept: ONCOLOGY | Age: 48
End: 2023-02-10
Payer: COMMERCIAL

## 2023-02-10 VITALS
DIASTOLIC BLOOD PRESSURE: 100 MMHG | TEMPERATURE: 98.1 F | OXYGEN SATURATION: 95 % | SYSTOLIC BLOOD PRESSURE: 160 MMHG | RESPIRATION RATE: 16 BRPM | WEIGHT: 222.2 LBS | BODY MASS INDEX: 39.37 KG/M2 | HEIGHT: 63 IN

## 2023-02-10 DIAGNOSIS — N28.89 RENAL MASS, LEFT: Primary | ICD-10-CM

## 2023-02-10 RX ORDER — HYDROCHLOROTHIAZIDE 25 MG/1
TABLET ORAL
COMMUNITY
Start: 2023-02-06

## 2023-02-10 RX ORDER — HYDROXYZINE 25 MG/1
TABLET, FILM COATED ORAL
COMMUNITY

## 2023-02-15 ENCOUNTER — TELEPHONE (OUTPATIENT)
Dept: ONCOLOGY | Age: 48
End: 2023-02-15

## 2023-02-15 NOTE — TELEPHONE ENCOUNTER
Patient set up appt with [facility] but wasn't able to get in with the Dr. That Dr. Diana Lopez reccommended. Would like to know if that's ok?     # [id number]

## 2023-02-15 NOTE — TELEPHONE ENCOUNTER
[address] at [place]  [phone]    02/15/23- Patient has an appointment on 3/3 with Dr. Cristal Rothman. Informed patient that Dr. Cristal Rothman is great and that appointment is just fine, per Dr. Zohaib Lomax. If they decide to do surgery, patient should schedule follow up with our office about 3 weeks after. She verbalized understanding and will keep our office updated. Remote Patient Monitoring Note      Date/Time:  1/9/2023 7:31 AM    LPN reviewed patients reported daily Remote Patient Monitoring metrics. All reported metrics are within normal limits. Plan/Follow Up: Will continue to review, monitor and address alerts with follow up based on severity of symptoms and risk factors.

## 2023-02-17 ENCOUNTER — TELEPHONE (OUTPATIENT)
Dept: ONCOLOGY | Age: 48
End: 2023-02-17

## 2023-02-17 NOTE — TELEPHONE ENCOUNTER
[facility]  [phone]    02/17/23- Phone call returned to pt she reported she received a call yesterday from central scheduling advising her that PET and MRI auths are pending and she should contact her insurance company to get an update. She was informed by Lcaey Bragg that both MRI and CT were denied and she is not able to go to [facility] facility either. She wanted to know what to do from here. Advised her that out office was not made aware of this. I will reach out to Lacey Bragg and get more information and let her know with an update. Phone call placed to Lacey Yemi [phone]- case #[id number] spoke to Loren Lizama. He reported both MRI and CT were denied and the facility. He will send over rationale why scans were denied via fax( this can take up to 24 hours to receive) and did not have alternative facility locations at this time as the case were completely denied. If MD wants to appeal case he can do so and facility options can be given at that time. MD informed.

## 2023-02-17 NOTE — TELEPHONE ENCOUNTER
Hi there, not sure if this would go to you or not. Patient called and stated the scan she had scheduled for today are not covered nor is the place she was having the scans done. She would like to know now what to do.     CB# [id number]

## 2023-02-21 ENCOUNTER — TELEPHONE (OUTPATIENT)
Dept: ONCOLOGY | Age: 48
End: 2023-02-21

## 2023-02-21 NOTE — TELEPHONE ENCOUNTER
[address] at [facility]  [phone]    02/21/23- Informed patient of the following, per Dr. Sonali Bell. Peer to peer performed. They are denying the MRI abdomen, since the patient already had a CT w/wo contrast.  They are approving the CT Chest, but they want it done in a free-standing imaging center. Patient had prior imaging done at [address] [place] [address], will assist with scheduling CT there. Will call her back with an appointment. 4:39 PM- CT scheduled at  on 3/6 at 6:45 pm (6:15 arrival). Order faxed to [phone], confirmation received. Patient notified of appointment information and verbalized understanding.

## 2023-02-21 NOTE — TELEPHONE ENCOUNTER
[address] at [place]  [phone]    Peer to peer performed. They are denying the MRI abdomen, since the patient already had a CT w/wo contrast.  They are approving the CT Chest, but they want it done in a free-standing imaging center. Not clear why they allowed her CT A/P in our system. I will see if we can arrange for the CT Chest to be done with [facility]. Authorization [id number].

## 2023-03-07 ENCOUNTER — PATIENT MESSAGE (OUTPATIENT)
Dept: ONCOLOGY | Age: 48
End: 2023-03-07

## 2023-05-05 PROBLEM — C64.2 RENAL CELL CARCINOMA, LEFT (HCC): Status: ACTIVE | Noted: 2023-05-05

## 2023-05-16 ENCOUNTER — OFFICE VISIT (OUTPATIENT)
Age: 48
End: 2023-05-16
Payer: COMMERCIAL

## 2023-05-16 VITALS
HEART RATE: 93 BPM | WEIGHT: 217.2 LBS | BODY MASS INDEX: 38.48 KG/M2 | OXYGEN SATURATION: 95 % | RESPIRATION RATE: 18 BRPM | HEIGHT: 63 IN | TEMPERATURE: 98.7 F | DIASTOLIC BLOOD PRESSURE: 99 MMHG | SYSTOLIC BLOOD PRESSURE: 165 MMHG

## 2023-05-16 DIAGNOSIS — C64.2 RENAL CELL CARCINOMA, LEFT (HCC): Primary | ICD-10-CM

## 2023-05-16 PROCEDURE — 3077F SYST BP >= 140 MM HG: CPT | Performed by: INTERNAL MEDICINE

## 2023-05-16 PROCEDURE — 99214 OFFICE O/P EST MOD 30 MIN: CPT | Performed by: INTERNAL MEDICINE

## 2023-05-16 PROCEDURE — 3080F DIAST BP >= 90 MM HG: CPT | Performed by: INTERNAL MEDICINE

## 2023-05-16 RX ORDER — SEMAGLUTIDE 0.68 MG/ML
INJECTION, SOLUTION SUBCUTANEOUS
COMMUNITY
Start: 2023-04-17

## 2023-05-16 RX ORDER — FLUCONAZOLE 150 MG/1
TABLET ORAL
COMMUNITY
Start: 2023-04-28

## 2023-05-16 ASSESSMENT — PATIENT HEALTH QUESTIONNAIRE - PHQ9
1. LITTLE INTEREST OR PLEASURE IN DOING THINGS: 1
SUM OF ALL RESPONSES TO PHQ QUESTIONS 1-9: 2
SUM OF ALL RESPONSES TO PHQ9 QUESTIONS 1 & 2: 2
SUM OF ALL RESPONSES TO PHQ QUESTIONS 1-9: 2
SUM OF ALL RESPONSES TO PHQ QUESTIONS 1-9: 2
2. FEELING DOWN, DEPRESSED OR HOPELESS: 1
SUM OF ALL RESPONSES TO PHQ QUESTIONS 1-9: 2

## 2023-05-16 NOTE — PROGRESS NOTES
Chief Complaint   Patient presents with    Follow-up           Vitals:    05/16/23 1100   BP: (!) 165/99   Pulse: 93   Resp: 18   Temp: 98.7 °F (37.1 °C)   SpO2: 95%    Patient states her BP seems to go up at our office. She denies lightheadedness or dizziness. Updated team of above. 1. Have you been to the ER, urgent care clinic since your last visit? Hospitalized since your last visit? Yes [facility] for partial nephrectomy on 4/24/23  2. Have you seen or consulted any other health care providers outside of the [address] [place] [address] since your last visit? Include any pap smears or colon screening.  Yes See above

## 2023-08-23 ENCOUNTER — TELEPHONE (OUTPATIENT)
Age: 48
End: 2023-08-23

## 2023-08-23 NOTE — TELEPHONE ENCOUNTER
Ricardo Campa at [facility]  [phone]    08/23/23- Called patient to remind her CT chest needs to be done at [address] [place] [address] prior to follow up 9/22. No answer, detailed voicemail left with the above information. Requested a return call with questions or concerns. Order faxed to  at [phone], confirmation received. 08/30/23- Call placed to [facility] [phone], spoke to Neil Narvaez to initiate PA. CT chest approved, Teresa Nowak #[id number]. Approval information faxed to , confirmation received. 09/05/23- CT scheduled for 9/8 with [facility].

## 2023-09-22 ENCOUNTER — OFFICE VISIT (OUTPATIENT)
Age: 48
End: 2023-09-22
Payer: COMMERCIAL

## 2023-09-22 ENCOUNTER — TELEPHONE (OUTPATIENT)
Age: 48
End: 2023-09-22

## 2023-09-22 VITALS
HEART RATE: 99 BPM | BODY MASS INDEX: 34.02 KG/M2 | TEMPERATURE: 98.2 F | RESPIRATION RATE: 18 BRPM | OXYGEN SATURATION: 97 % | WEIGHT: 192 LBS | SYSTOLIC BLOOD PRESSURE: 135 MMHG | HEIGHT: 63 IN | DIASTOLIC BLOOD PRESSURE: 87 MMHG

## 2023-09-22 DIAGNOSIS — C64.2 RENAL CELL CARCINOMA, LEFT (HCC): Primary | ICD-10-CM

## 2023-09-22 DIAGNOSIS — N63.20 MASS OF LEFT BREAST, UNSPECIFIED QUADRANT: ICD-10-CM

## 2023-09-22 DIAGNOSIS — R91.8 LUNG NODULES: ICD-10-CM

## 2023-09-22 PROCEDURE — 3075F SYST BP GE 130 - 139MM HG: CPT | Performed by: INTERNAL MEDICINE

## 2023-09-22 PROCEDURE — 3079F DIAST BP 80-89 MM HG: CPT | Performed by: INTERNAL MEDICINE

## 2023-09-22 PROCEDURE — 99214 OFFICE O/P EST MOD 30 MIN: CPT | Performed by: INTERNAL MEDICINE

## 2023-09-22 NOTE — TELEPHONE ENCOUNTER
Ricardo Campa at [facility]  [phone]     09/22/23- Several attempts made to [facility] imaging to have imaging schedule. All calls have been disconnected or phone keeps ringing. 09/27/23-Phone call placed to [address] [place] [address] [phone]- again unable to reach anyone at any of prompts. 09/28/23- Phone call placed to  scheduling department [phone] the following scheduled. Mammogram and US scheduled for 9/29/23 at 1:00 pm- this was scheduled by patient already. CT scheduled for 09/13/2024 at 8:00 am.    Orders faxed to [phone]- fax confirmation     Phone call placed to [phone] spoke to [facility]- no authorization needed for Mammo/US and its to early to get authorization for CT for next year- should call back a few week prior to obtain authorization. Call reference # [id number].

## 2023-09-22 NOTE — PROGRESS NOTES
Juma Ferrell is a 46 y.o. female follow up for RCC. 1. Have you been to the ER, urgent care clinic since your last visit? Hospitalized since your last visit?no    2. Have you seen or consulted any other health care providers outside of the [address] [place] [address] since your last visit? Include any pap smears or colon screening.  no

## 2023-09-27 ENCOUNTER — TELEPHONE (OUTPATIENT)
Age: 48
End: 2023-09-27

## 2023-09-27 NOTE — TELEPHONE ENCOUNTER
[facility] called in stating they are looking for orders for a L Breast US and a mammogram. Mammogram order has been faxed but do not see orders for the [address]     CB# [phone]

## 2023-09-28 DIAGNOSIS — N64.9 BREAST LESION: Primary | ICD-10-CM

## 2024-07-23 ENCOUNTER — HOSPITAL ENCOUNTER (EMERGENCY)
Facility: HOSPITAL | Age: 49
Discharge: HOME | End: 2024-07-23
Attending: STUDENT IN AN ORGANIZED HEALTH CARE EDUCATION/TRAINING PROGRAM
Payer: COMMERCIAL

## 2024-07-23 VITALS
BODY MASS INDEX: 36.09 KG/M2 | WEIGHT: 203.71 LBS | HEIGHT: 63 IN | HEART RATE: 80 BPM | TEMPERATURE: 98 F | DIASTOLIC BLOOD PRESSURE: 81 MMHG | RESPIRATION RATE: 20 BRPM | SYSTOLIC BLOOD PRESSURE: 172 MMHG | OXYGEN SATURATION: 99 %

## 2024-07-23 DIAGNOSIS — L03.811 ABSCESS OR CELLULITIS OF SCALP: Primary | ICD-10-CM

## 2024-07-23 PROCEDURE — 99283 EMERGENCY DEPT VISIT LOW MDM: CPT

## 2024-07-23 RX ORDER — CEPHALEXIN 500 MG/1
500 CAPSULE ORAL 4 TIMES DAILY
Qty: 28 CAPSULE | Refills: 0 | Status: ON HOLD | OUTPATIENT
Start: 2024-07-23 | End: 2024-07-28 | Stop reason: HOSPADM

## 2024-07-23 ASSESSMENT — ENCOUNTER SYMPTOMS
SHORTNESS OF BREATH: 0
ABDOMINAL PAIN: 0
COUGH: 0
COLOR CHANGE: 0
NAUSEA: 0
DIARRHEA: 0
VOMITING: 0
RHINORRHEA: 0
EYE DISCHARGE: 0
BACK PAIN: 0
SORE THROAT: 0
SINUS PAIN: 0
WHEEZING: 0

## 2024-07-23 NOTE — DISCHARGE INSTRUCTIONS
Alternate acetaminophen 1000mg and ibuprofen 800mg every 4 hours as needed for pain. Take antibiotics as prescribed. Please follow-up with your primary care physician.

## 2024-07-23 NOTE — ED PROVIDER NOTES
preliminarily interpreted by the emergency physician with the below findings:        Interpretation per the Radiologist below, if available at the time of this note:    No orders to display        LABS:  Labs Reviewed - No data to display      All other labs were within normal range or not returned as of this dictation.    EMERGENCY DEPARTMENT COURSE and DIFFERENTIAL DIAGNOSIS/MDM:   Vitals:    Vitals:    07/23/24 1800   BP: (!) 172/81   Pulse: 80   Resp: 20   Temp: 98 °F (36.7 °C)   SpO2: 99%   Weight: 92.4 kg (203 lb 11.3 oz)   Height: 1.6 m (5' 3\")           Medical Decision Making  Patient is a 49-year-old female who presents with head pain that radiates to behind her ear.  On exam there is a palpable area of induration, no fluctuance needed.  Do not feel she would benefit from the ED at this point given the size of the area.  Will start patient on antibiotics.  There is also reactive posterior clavicular lymphadenopathy.  Advised alternating Tylenol and ibuprofen.  Patient will follow-up with her primary care physician for reevaluation.  Do not feel additional labs and imaging are indicated at this time.    Amount and/or Complexity of Data Reviewed  Labs: ordered.  Radiology: ordered.    Risk  Prescription drug management.            REASSESSMENT            CONSULTS:  None    PROCEDURES:  Unless otherwise noted below, none     Procedures      FINAL IMPRESSION      1. Abscess or cellulitis of scalp          DISPOSITION/PLAN   DISPOSITION Decision To Discharge 07/23/2024 06:07:20 PM      PATIENT REFERRED TO:  Lisa Heller, VALERIE - [place]  [address] [facility]  [address]  [facility] [address] [phone]    Schedule an appointment as soon as possible for a visit         DISCHARGE MEDICATIONS:  New Prescriptions    CEPHALEXIN (KEFLEX) 500 MG CAPSULE    Take 1 capsule by mouth 4 times daily         (Please note that portions of this note were completed with a voice recognition program.  Efforts were made to

## 2024-07-23 NOTE — ED TRIAGE NOTES
Patient to ER for complaints of right side head pain starting 07/20.     Patient notes knot to right side of head. Patient reports swollen lymph node to the right side of neck.     Denies any falls or injuries.     Denies chest pain and SOB.     SO Dasilva in triage to assess patient.

## 2024-07-26 ENCOUNTER — APPOINTMENT (OUTPATIENT)
Facility: HOSPITAL | Age: 49
End: 2024-07-26
Payer: COMMERCIAL

## 2024-07-26 ENCOUNTER — HOSPITAL ENCOUNTER (OUTPATIENT)
Facility: HOSPITAL | Age: 49
Setting detail: OBSERVATION
Discharge: HOME | End: 2024-07-28
Attending: EMERGENCY MEDICINE | Admitting: INTERNAL MEDICINE
Payer: COMMERCIAL

## 2024-07-26 DIAGNOSIS — B02.29 NEURALGIA, POST-HERPETIC: Primary | ICD-10-CM

## 2024-07-26 DIAGNOSIS — R51.9 NONINTRACTABLE HEADACHE, UNSPECIFIED CHRONICITY PATTERN, UNSPECIFIED HEADACHE TYPE: ICD-10-CM

## 2024-07-26 DIAGNOSIS — I10 POORLY-CONTROLLED HYPERTENSION: ICD-10-CM

## 2024-07-26 DIAGNOSIS — I63.9 ISCHEMIC STROKE (HCC): ICD-10-CM

## 2024-07-26 LAB
ANION GAP SERPL CALC-SCNC: 5 MMOL/L (ref 5–15)
BASOPHILS # BLD: 0.1 K/UL (ref 0–0.1)
BASOPHILS NFR BLD: 1 % (ref 0–1)
BUN SERPL-MCNC: 9 MG/DL (ref 6–20)
BUN/CREAT SERPL: 11 (ref 12–20)
CALCIUM SERPL-MCNC: 8.8 MG/DL (ref 8.5–10.1)
CHLORIDE SERPL-SCNC: 109 MMOL/L (ref 97–108)
CO2 SERPL-SCNC: 26 MMOL/L (ref 21–32)
CREAT SERPL-MCNC: 0.81 MG/DL (ref 0.55–1.02)
DIFFERENTIAL METHOD BLD: ABNORMAL
EOSINOPHIL # BLD: 0.3 K/UL (ref 0–0.4)
EOSINOPHIL NFR BLD: 3 % (ref 0–7)
ERYTHROCYTE [DISTWIDTH] IN BLOOD BY AUTOMATED COUNT: 13.2 % (ref 11.5–14.5)
GLUCOSE SERPL-MCNC: 135 MG/DL (ref 65–100)
HCT VFR BLD AUTO: 36.2 % (ref 35–47)
HGB BLD-MCNC: 12.6 G/DL (ref 11.5–16)
IMM GRANULOCYTES # BLD AUTO: 0 K/UL (ref 0–0.04)
IMM GRANULOCYTES NFR BLD AUTO: 1 % (ref 0–0.5)
LYMPHOCYTES # BLD: 3.1 K/UL (ref 0.8–3.5)
LYMPHOCYTES NFR BLD: 39 % (ref 12–49)
MCH RBC QN AUTO: 28.6 PG (ref 26–34)
MCHC RBC AUTO-ENTMCNC: 34.8 G/DL (ref 30–36.5)
MCV RBC AUTO: 82.3 FL (ref 80–99)
MONOCYTES # BLD: 0.4 K/UL (ref 0–1)
MONOCYTES NFR BLD: 5 % (ref 5–13)
NEUTS SEG # BLD: 4.1 K/UL (ref 1.8–8)
NEUTS SEG NFR BLD: 51 % (ref 32–75)
NRBC # BLD: 0 K/UL (ref 0–0.01)
NRBC BLD-RTO: 0 PER 100 WBC
PLATELET # BLD AUTO: 231 K/UL (ref 150–400)
PMV BLD AUTO: 9.7 FL (ref 8.9–12.9)
POTASSIUM SERPL-SCNC: 3.8 MMOL/L (ref 3.5–5.1)
RBC # BLD AUTO: 4.4 M/UL (ref 3.8–5.2)
SODIUM SERPL-SCNC: 140 MMOL/L (ref 136–145)
WBC # BLD AUTO: 8 K/UL (ref 3.6–11)

## 2024-07-26 PROCEDURE — 36415 COLL VENOUS BLD VENIPUNCTURE: CPT

## 2024-07-26 PROCEDURE — 6370000000 HC RX 637 (ALT 250 FOR IP): Performed by: INTERNAL MEDICINE

## 2024-07-26 PROCEDURE — A9575 INJ GADOTERATE MEGLUMI 0.1ML: HCPCS | Performed by: RADIOLOGY

## 2024-07-26 PROCEDURE — 99223 1ST HOSP IP/OBS HIGH 75: CPT | Performed by: NURSE PRACTITIONER

## 2024-07-26 PROCEDURE — 70553 MRI BRAIN STEM W/O & W/DYE: CPT

## 2024-07-26 PROCEDURE — 96374 THER/PROPH/DIAG INJ IV PUSH: CPT

## 2024-07-26 PROCEDURE — 6360000002 HC RX W HCPCS: Performed by: INTERNAL MEDICINE

## 2024-07-26 PROCEDURE — 96372 THER/PROPH/DIAG INJ SC/IM: CPT

## 2024-07-26 PROCEDURE — 85025 COMPLETE CBC W/AUTO DIFF WBC: CPT

## 2024-07-26 PROCEDURE — 2580000003 HC RX 258: Performed by: INTERNAL MEDICINE

## 2024-07-26 PROCEDURE — 80048 BASIC METABOLIC PNL TOTAL CA: CPT

## 2024-07-26 PROCEDURE — 6370000000 HC RX 637 (ALT 250 FOR IP): Performed by: EMERGENCY MEDICINE

## 2024-07-26 PROCEDURE — G0378 HOSPITAL OBSERVATION PER HR: HCPCS

## 2024-07-26 PROCEDURE — 6360000004 HC RX CONTRAST MEDICATION: Performed by: RADIOLOGY

## 2024-07-26 PROCEDURE — 70450 CT HEAD/BRAIN W/O DYE: CPT

## 2024-07-26 PROCEDURE — 94761 N-INVAS EAR/PLS OXIMETRY MLT: CPT

## 2024-07-26 PROCEDURE — 96376 TX/PRO/DX INJ SAME DRUG ADON: CPT

## 2024-07-26 PROCEDURE — 99285 EMERGENCY DEPT VISIT HI MDM: CPT

## 2024-07-26 RX ORDER — SODIUM CHLORIDE 0.9 % (FLUSH) 0.9 %
5-40 SYRINGE (ML) INJECTION PRN
Status: DISCONTINUED | OUTPATIENT
Start: 2024-07-26 | End: 2024-07-28 | Stop reason: HOSPADM

## 2024-07-26 RX ORDER — OXYCODONE HYDROCHLORIDE 5 MG/1
10 TABLET ORAL EVERY 4 HOURS PRN
Status: DISCONTINUED | OUTPATIENT
Start: 2024-07-26 | End: 2024-07-28 | Stop reason: HOSPADM

## 2024-07-26 RX ORDER — SODIUM CHLORIDE 9 MG/ML
INJECTION, SOLUTION INTRAVENOUS PRN
Status: DISCONTINUED | OUTPATIENT
Start: 2024-07-26 | End: 2024-07-28 | Stop reason: HOSPADM

## 2024-07-26 RX ORDER — ACETAMINOPHEN 650 MG/1
650 SUPPOSITORY RECTAL EVERY 6 HOURS PRN
Status: DISCONTINUED | OUTPATIENT
Start: 2024-07-26 | End: 2024-07-28 | Stop reason: HOSPADM

## 2024-07-26 RX ORDER — ASPIRIN 325 MG
325 TABLET ORAL
Status: COMPLETED | OUTPATIENT
Start: 2024-07-26 | End: 2024-07-26

## 2024-07-26 RX ORDER — POTASSIUM CHLORIDE 750 MG/1
40 TABLET, FILM COATED, EXTENDED RELEASE ORAL PRN
Status: DISCONTINUED | OUTPATIENT
Start: 2024-07-26 | End: 2024-07-28 | Stop reason: HOSPADM

## 2024-07-26 RX ORDER — ONDANSETRON 2 MG/ML
4 INJECTION INTRAMUSCULAR; INTRAVENOUS EVERY 6 HOURS PRN
Status: DISCONTINUED | OUTPATIENT
Start: 2024-07-26 | End: 2024-07-28 | Stop reason: HOSPADM

## 2024-07-26 RX ORDER — ACETAMINOPHEN 325 MG/1
650 TABLET ORAL EVERY 6 HOURS PRN
Status: DISCONTINUED | OUTPATIENT
Start: 2024-07-26 | End: 2024-07-28 | Stop reason: HOSPADM

## 2024-07-26 RX ORDER — MAGNESIUM SULFATE IN WATER 40 MG/ML
2000 INJECTION, SOLUTION INTRAVENOUS PRN
Status: DISCONTINUED | OUTPATIENT
Start: 2024-07-26 | End: 2024-07-28 | Stop reason: HOSPADM

## 2024-07-26 RX ORDER — OXYCODONE HYDROCHLORIDE 5 MG/1
5 TABLET ORAL EVERY 4 HOURS PRN
Status: DISCONTINUED | OUTPATIENT
Start: 2024-07-26 | End: 2024-07-26

## 2024-07-26 RX ORDER — SODIUM CHLORIDE 0.9 % (FLUSH) 0.9 %
5-40 SYRINGE (ML) INJECTION EVERY 12 HOURS SCHEDULED
Status: DISCONTINUED | OUTPATIENT
Start: 2024-07-26 | End: 2024-07-28 | Stop reason: HOSPADM

## 2024-07-26 RX ORDER — KETOROLAC TROMETHAMINE 15 MG/ML
15 INJECTION, SOLUTION INTRAMUSCULAR; INTRAVENOUS EVERY 6 HOURS
Status: DISCONTINUED | OUTPATIENT
Start: 2024-07-26 | End: 2024-07-28 | Stop reason: HOSPADM

## 2024-07-26 RX ORDER — ENOXAPARIN SODIUM 100 MG/ML
40 INJECTION SUBCUTANEOUS DAILY
Status: DISCONTINUED | OUTPATIENT
Start: 2024-07-26 | End: 2024-07-28 | Stop reason: HOSPADM

## 2024-07-26 RX ORDER — GADOTERATE MEGLUMINE 376.9 MG/ML
18 INJECTION INTRAVENOUS
Status: COMPLETED | OUTPATIENT
Start: 2024-07-26 | End: 2024-07-26

## 2024-07-26 RX ORDER — ONDANSETRON 4 MG/1
4 TABLET, ORALLY DISINTEGRATING ORAL EVERY 8 HOURS PRN
Status: DISCONTINUED | OUTPATIENT
Start: 2024-07-26 | End: 2024-07-28 | Stop reason: HOSPADM

## 2024-07-26 RX ORDER — MORPHINE SULFATE 2 MG/ML
2 INJECTION, SOLUTION INTRAMUSCULAR; INTRAVENOUS
Status: DISCONTINUED | OUTPATIENT
Start: 2024-07-26 | End: 2024-07-28 | Stop reason: HOSPADM

## 2024-07-26 RX ORDER — POTASSIUM CHLORIDE 7.45 MG/ML
10 INJECTION INTRAVENOUS PRN
Status: DISCONTINUED | OUTPATIENT
Start: 2024-07-26 | End: 2024-07-28 | Stop reason: HOSPADM

## 2024-07-26 RX ORDER — GABAPENTIN 300 MG/1
300 CAPSULE ORAL 3 TIMES DAILY
Status: DISCONTINUED | OUTPATIENT
Start: 2024-07-26 | End: 2024-07-28 | Stop reason: HOSPADM

## 2024-07-26 RX ORDER — POLYETHYLENE GLYCOL 3350 17 G/17G
17 POWDER, FOR SOLUTION ORAL DAILY PRN
Status: DISCONTINUED | OUTPATIENT
Start: 2024-07-26 | End: 2024-07-28 | Stop reason: HOSPADM

## 2024-07-26 RX ADMIN — OXYCODONE 5 MG: 5 TABLET ORAL at 13:57

## 2024-07-26 RX ADMIN — ASPIRIN 325 MG: 325 TABLET ORAL at 08:36

## 2024-07-26 RX ADMIN — KETOROLAC TROMETHAMINE 15 MG: 15 INJECTION, SOLUTION INTRAMUSCULAR; INTRAVENOUS at 11:16

## 2024-07-26 RX ADMIN — GABAPENTIN 300 MG: 300 CAPSULE ORAL at 21:35

## 2024-07-26 RX ADMIN — ENOXAPARIN SODIUM 40 MG: 100 INJECTION SUBCUTANEOUS at 11:16

## 2024-07-26 RX ADMIN — SODIUM CHLORIDE, PRESERVATIVE FREE 10 ML: 5 INJECTION INTRAVENOUS at 21:35

## 2024-07-26 RX ADMIN — GABAPENTIN 300 MG: 300 CAPSULE ORAL at 15:31

## 2024-07-26 RX ADMIN — GADOTERATE MEGLUMINE 18 ML: 376.9 INJECTION, SOLUTION INTRAVENOUS at 10:21

## 2024-07-26 RX ADMIN — KETOROLAC TROMETHAMINE 15 MG: 15 INJECTION, SOLUTION INTRAMUSCULAR; INTRAVENOUS at 23:07

## 2024-07-26 RX ADMIN — KETOROLAC TROMETHAMINE 15 MG: 15 INJECTION, SOLUTION INTRAMUSCULAR; INTRAVENOUS at 16:47

## 2024-07-26 RX ADMIN — OXYCODONE 10 MG: 5 TABLET ORAL at 21:34

## 2024-07-26 ASSESSMENT — PAIN SCALES - GENERAL
PAINLEVEL_OUTOF10: 0
PAINLEVEL_OUTOF10: 10
PAINLEVEL_OUTOF10: 0
PAINLEVEL_OUTOF10: 1
PAINLEVEL_OUTOF10: 8
PAINLEVEL_OUTOF10: 1
PAINLEVEL_OUTOF10: 7
PAINLEVEL_OUTOF10: 9

## 2024-07-26 ASSESSMENT — PAIN DESCRIPTION - LOCATION
LOCATION: HEAD
LOCATION: HEAD

## 2024-07-26 ASSESSMENT — PAIN - FUNCTIONAL ASSESSMENT
PAIN_FUNCTIONAL_ASSESSMENT: 0-10
PAIN_FUNCTIONAL_ASSESSMENT: NONE - DENIES PAIN

## 2024-07-26 NOTE — ED TRIAGE NOTES
Pt ambulatory to triage c/o shooting pain n her head. States she has a bump on her head that hasn't gotten better since she was here last.    Denies N/V/dizziness

## 2024-07-26 NOTE — CONSULTS
[facility]: [facility]  Trini Beal, MARILEEA, CNRN, ACNP-BC  [facility]  [address] [facility]  [phone]      Name:   Namita Renteria   Medical record #: [id number]  Admission Date: 7/26/2024     Consult requested by: Christiano Monahan MD     Reason for Consult:  Right occipital headache    HISTORY OF PRESENT ILLNESS:     This is a 49 y.o. female who is admitted for headache.  Ms. Renteria presented to the ED on 7/26/2024 with a 1 history of pain that starts behind her right ear and radiates down into her occipital area.  She describes it as an intermittent pulsating sharp pain.  She denies a history of migraine.  Upon arrival to the ED her exam was nonfocal with a presenting blood pressure of 169/84.  Review of labs shows a normal CBC, CMP and urine.  While being worked up for this headache a CT head was performed which did show an old infarct in her left internal capsule.  Ms. Renteria has a history of a stage I renal cell carcinoma which was resected and is followed by Dr. Mathis and his team.       the Neurology Service is asked to evaluate for stroke.    Patient has not been seen by the [facility] Neurology team previously.    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        EKG: normal sinus rhythm.    Care Plan discussed with:  Patient and Primary team      Thank you for allowing the Neurology Service the pleasure of participating in the care of your patient.      Assessment/Plan:     1.  49-year-old female who presents with a 1 week history of pain in her occipital area differential includes complex migraine versus early Bell's palsy/trigeminal neuralgia:    MRI reviewed by Dr. 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  He will see her in his office after discharge to

## 2024-07-26 NOTE — PROGRESS NOTES
Spiritual Care Assessment/Progress Note  [facility]    Name: Namita Renteria MRN: [id number]    Age: 49 y.o.     Sex: female   Language: English     Date: 7/26/2024            Total Time Calculated: 30 min              Spiritual Assessment begun in [facility] MULTI-SPECIALTY ONCOLOGY 2  Service Provided For: Patient  Referral/Consult From: Rounding  Encounter Overview/Reason: Initial Encounter    Spiritual beliefs:      [x] Involved in a dre tradition/spiritual practice: Worship      [] Supported by a dre community:      [] Claims no spiritual orientation:      [] Seeking spiritual identity:           [] Adheres to an individual form of spirituality:      [] Not able to assess:                Identified resources for coping and support system:   Support System: Family members       [] Prayer                  [] Devotional reading               [] Music                  [] Guided Imagery     [] Pet visits                                        [] Other: (COMMENT)     Specific area/focus of visit   Encounter:    Crisis:    Spiritual/Emotional needs: Type: Spiritual Support  Ritual, Rites and Sacraments:    Grief, Loss, and Adjustments:    Ethics/Mediation:    Behavioral Health:    Palliative Care:    Advance Care Planning:      Plan/Referrals: Other (Comment) (Contact Spiritual Health for further visits)    Narrative: Initial spiritual assessment in 5th Floor. Reviewed chart prior to visit. Miss Renteria share the roler coaster she has been on since 8:30 am this morning.  Several different diagnosis were shared and then changes so it has been quite a journey for her over these past few hours.  She has a 20 year old son and she helps to care for her mother.  She has a Worship belief in God.  We explored different ways in which God helps her.  Provided spiritual presence and assurance of prayer.  Contact Spiritual Care for any further referrals.  timothy Barr MDiv., MS., [facility]  Page a

## 2024-07-26 NOTE — PLAN OF CARE
Problem: Neurosensory - Adult  Goal: Achieves stable or improved neurological status  Outcome: Progressing     Problem: Skin/Tissue Integrity - Adult  Goal: Skin integrity remains intact  Outcome: Progressing     Problem: Gastrointestinal - Adult  Goal: Maintains or returns to baseline bowel function  Outcome: Progressing  Goal: Maintains adequate nutritional intake  Outcome: Progressing

## 2024-07-26 NOTE — ED PROVIDER NOTES
[facility] EMERGENCY DEPT  EMERGENCY DEPARTMENT ENCOUNTER      Pt Name: Namita Renteria  MRN: [id number]  Birthdate 1975  Date of evaluation: 7/26/2024  Provider: Lester Vazquez MD    CHIEF COMPLAINT       Chief Complaint   Patient presents with    Headache         HISTORY OF PRESENT ILLNESS   (Location/Symptom, Timing/Onset, Context/Setting, Quality, Duration, Modifying Factors, Severity)  Note limiting factors.   49M w/ hx HTN, RCC, DM, HLD p/w 1wk HA. Pt reports HA started after developed a scalp wound that started draining. Now has pain radiating throughout the right side of her head. Also noticed a lump at the of her right neck. Seen here for this and started on cephalexin. States that wound much improved but pain has persisted. Describes sharp pain coming in waves. Denies any recent head injuries, brain surgeries, neck pain/stiffness, fevers, vomiting, vision/speech changes, gait abnl, facial droop, ext weakness/numbness, syncope, seizure or hx of intracranial aneurysms. Taking nsaids and tylenol w/o relief. Reports a distant hx of migraines but states that this feels different.              Review of External Medical Records:     Nursing Notes were reviewed.    REVIEW OF SYSTEMS    (2-9 systems for level 4, 10 or more for level 5)     Review of Systems   Constitutional:  Negative for diaphoresis and fever.   HENT:  Negative for nosebleeds.    Eyes:  Negative for visual disturbance.   Respiratory:  Negative for cough and shortness of breath.    Cardiovascular:  Negative for chest pain, palpitations and leg swelling.   Gastrointestinal:  Negative for abdominal distention, abdominal pain, anal bleeding, blood in stool, diarrhea, nausea and vomiting.   Endocrine: Negative for polyuria.   Genitourinary:  Negative for difficulty urinating, dysuria, frequency and hematuria.   Musculoskeletal:  Negative for joint swelling.   Skin:  Negative for wound.   Allergic/Immunologic: Negative for immunocompromised state.

## 2024-07-27 PROCEDURE — 94761 N-INVAS EAR/PLS OXIMETRY MLT: CPT

## 2024-07-27 PROCEDURE — 96372 THER/PROPH/DIAG INJ SC/IM: CPT

## 2024-07-27 PROCEDURE — 6360000002 HC RX W HCPCS: Performed by: INTERNAL MEDICINE

## 2024-07-27 PROCEDURE — 6370000000 HC RX 637 (ALT 250 FOR IP): Performed by: INTERNAL MEDICINE

## 2024-07-27 PROCEDURE — G0378 HOSPITAL OBSERVATION PER HR: HCPCS

## 2024-07-27 PROCEDURE — 2580000003 HC RX 258: Performed by: INTERNAL MEDICINE

## 2024-07-27 PROCEDURE — 96376 TX/PRO/DX INJ SAME DRUG ADON: CPT

## 2024-07-27 RX ORDER — LOSARTAN POTASSIUM 50 MG/1
50 TABLET ORAL DAILY
COMMUNITY

## 2024-07-27 RX ORDER — LOSARTAN POTASSIUM 50 MG/1
50 TABLET ORAL DAILY
Status: DISCONTINUED | OUTPATIENT
Start: 2024-07-27 | End: 2024-07-28 | Stop reason: HOSPADM

## 2024-07-27 RX ORDER — VALACYCLOVIR HYDROCHLORIDE 500 MG/1
1000 TABLET, FILM COATED ORAL 3 TIMES DAILY
Status: DISCONTINUED | OUTPATIENT
Start: 2024-07-27 | End: 2024-07-28 | Stop reason: HOSPADM

## 2024-07-27 RX ORDER — VALACYCLOVIR HYDROCHLORIDE 500 MG/1
500 TABLET, FILM COATED ORAL ONCE
Status: COMPLETED | OUTPATIENT
Start: 2024-07-27 | End: 2024-07-27

## 2024-07-27 RX ORDER — VALACYCLOVIR HYDROCHLORIDE 500 MG/1
500 TABLET, FILM COATED ORAL 3 TIMES DAILY
Status: DISCONTINUED | OUTPATIENT
Start: 2024-07-27 | End: 2024-07-27

## 2024-07-27 RX ADMIN — SODIUM CHLORIDE, PRESERVATIVE FREE 10 ML: 5 INJECTION INTRAVENOUS at 09:05

## 2024-07-27 RX ADMIN — GABAPENTIN 300 MG: 300 CAPSULE ORAL at 13:57

## 2024-07-27 RX ADMIN — KETOROLAC TROMETHAMINE 15 MG: 15 INJECTION, SOLUTION INTRAMUSCULAR; INTRAVENOUS at 16:08

## 2024-07-27 RX ADMIN — SODIUM CHLORIDE, PRESERVATIVE FREE 5 ML: 5 INJECTION INTRAVENOUS at 20:43

## 2024-07-27 RX ADMIN — VALACYCLOVIR HYDROCHLORIDE 500 MG: 500 TABLET, FILM COATED ORAL at 16:08

## 2024-07-27 RX ADMIN — KETOROLAC TROMETHAMINE 15 MG: 15 INJECTION, SOLUTION INTRAMUSCULAR; INTRAVENOUS at 04:51

## 2024-07-27 RX ADMIN — ENOXAPARIN SODIUM 40 MG: 100 INJECTION SUBCUTANEOUS at 09:05

## 2024-07-27 RX ADMIN — VALACYCLOVIR HYDROCHLORIDE 500 MG: 500 TABLET, FILM COATED ORAL at 16:32

## 2024-07-27 RX ADMIN — LOSARTAN POTASSIUM 50 MG: 50 TABLET, FILM COATED ORAL at 16:08

## 2024-07-27 RX ADMIN — VALACYCLOVIR HYDROCHLORIDE 1000 MG: 500 TABLET, FILM COATED ORAL at 22:31

## 2024-07-27 RX ADMIN — KETOROLAC TROMETHAMINE 15 MG: 15 INJECTION, SOLUTION INTRAMUSCULAR; INTRAVENOUS at 22:31

## 2024-07-27 RX ADMIN — KETOROLAC TROMETHAMINE 15 MG: 15 INJECTION, SOLUTION INTRAMUSCULAR; INTRAVENOUS at 10:56

## 2024-07-27 RX ADMIN — GABAPENTIN 300 MG: 300 CAPSULE ORAL at 09:05

## 2024-07-27 RX ADMIN — GABAPENTIN 300 MG: 300 CAPSULE ORAL at 20:43

## 2024-07-27 ASSESSMENT — PAIN DESCRIPTION - ORIENTATION: ORIENTATION: RIGHT

## 2024-07-27 ASSESSMENT — PAIN SCALES - GENERAL
PAINLEVEL_OUTOF10: 0

## 2024-07-27 ASSESSMENT — PAIN - FUNCTIONAL ASSESSMENT
PAIN_FUNCTIONAL_ASSESSMENT: ACTIVITIES ARE NOT PREVENTED
PAIN_FUNCTIONAL_ASSESSMENT: ACTIVITIES ARE NOT PREVENTED

## 2024-07-27 ASSESSMENT — PAIN DESCRIPTION - DESCRIPTORS: DESCRIPTORS: DULL

## 2024-07-27 ASSESSMENT — PAIN DESCRIPTION - LOCATION: LOCATION: HEAD

## 2024-07-27 NOTE — H&P
Hospitalist Admission Note    NAME: Namita Renteria   :  1975   MRN:  [id number]     Date/Time:  24 10:01 AM    Patient PCP: Lisa Heller APRN - NP      CHIEF COMPLAINT:      HISTORY OF PRESENT ILLNESS:     Namita Renteria is a 49 y.o.  female with history of diabetes mellitus, hypertension, and a very remote episode of shingles when he was she was a teenager presenting with acute onset of headache.  Patient states that she was seen in the emergency room a couple get days back for an abscess of her scalp.  She was started on antibiotics.  The abscess is healing.  She developed shooting pains into the outside of her head, the right side, and noticed an enlarged lymph node behind her right ear.  In the ER her symptoms are uncontrolled.  Neurology was consulted.  She is being admitted to the hospitalist service due to concern for possible neuralgia.      PAST MEDICAL HISTORY:      Past Medical History:   Diagnosis Date    Diabetes mellitus (HCC)     Gestational diabetes 2012    Headache(784.0)     History of gestational diabetes 2012    HTN (hypertension) 2012    Obesity 2012    Ovarian cyst       Past Surgical History:   Procedure Laterality Date    CHOLECYSTECTOMY      PARTIAL NEPHRECTOMY  2023    UT APPENDECTOMY      TUBAL LIGATION       Social History     Tobacco Use    Smoking status: Never    Smokeless tobacco: Never   Substance Use Topics    Alcohol use: Not Currently      Family History   Problem Relation Age of Onset    Diabetes Daughter         type I    Diabetes Mother     Hypertension Mother     COPD Father       Allergies   Allergen Reactions    Metformin Diarrhea          REVIEW OF SYSTEMS:      General:   No fever or chills  Eyes: No changes in vision.  ENT:  No rhinorrhea,  congestion, or odynophagia.  Lungs:  No cough or dyspnea  Heart:   No chest pain or palpitations   GI:  No abdominal pain, nausea, or vomiting.  No diarrhea or constipation  :  No

## 2024-07-27 NOTE — PLAN OF CARE
Problem: Neurosensory - Adult  Goal: Achieves stable or improved neurological status  7/26/2024 2342 by Ridge Dallas RN  Outcome: Progressing  7/26/2024 1250 by Mili Zepeda RN  Outcome: Progressing     Problem: Skin/Tissue Integrity - Adult  Goal: Skin integrity remains intact  7/26/2024 2342 by Ridge Dallas RN  Outcome: Progressing  7/26/2024 1250 by Mili Zepeda RN  Outcome: Progressing     Problem: Gastrointestinal - Adult  Goal: Maintains or returns to baseline bowel function  7/26/2024 2342 by Ridge Dallas RN  Outcome: Progressing  7/26/2024 1250 by Mili Zepeda RN  Outcome: Progressing  Goal: Maintains adequate nutritional intake  7/26/2024 2342 by Ridge Dallas RN  Outcome: Progressing  7/26/2024 1250 by Mili Zepeda RN  Outcome: Progressing

## 2024-07-27 NOTE — PROGRESS NOTES
Hospitalist Progress Note          Pt Name  Namita Renteria   Date of Birth 1975   Medical Record Number  [id number]      Age  49 y.o.   PCP Lisa Heller APRN - NP   Admit date:  7/26/2024  6:50 AM     Room Number  525/01  @ [facility]   Date of Service  7/27/24       Admission Diagnoses:  Headache     Admission Summary:  \" Namita Renteria is a 49 y.o.  female with history of diabetes mellitus, hypertension, and a very remote episode of shingles when he was she was a teenager presenting with acute onset of headache.  Patient states that she was seen in the emergency room a couple get days back for an abscess of her scalp.  She was started on antibiotics.  The abscess is healing.  She developed shooting pains into the outside of her head, the right side, and noticed an enlarged lymph node behind her right ear.  In the ER her symptoms are uncontrolled.  Neurology was consulted.  She is being admitted to the hospitalist service due to concern for possible neuralgia.  \"     Assessment and plan:       Headache -possibly   Herpetic neuralgia in this pt who has had recent scalp abscess drained   Theoretically, one can postulate that the pt's Zoster blister was infected and turned into abscess which is now s/p drainage done PTA   New periorbital lesion 2mm angel red area without blister,   Continue Gabapentin   Continue Toradol as ordered - seems to have helped   Start Valacyclovir 1000 mg TID   I will revisit the pt tomorrow and s/s improve, we will discharge her home with PO meds     HTN -uncontrolled - medication reconciliation was incomplete   Resume Losartan 50mg daily starting today     Body mass index is 35.96 kg/m². -Class I obesity      Medical Decision Making:   Relevant labs were reviewed: yes   I have reviewed imaging report(s):yes   EKG reviewed : N/A   High risk/toxic drug monitoring: Neurontin addictive potential discussed with pt   Care Plan discussed with:Patient/Family  and Nurse

## 2024-07-28 VITALS
TEMPERATURE: 97.5 F | SYSTOLIC BLOOD PRESSURE: 153 MMHG | WEIGHT: 203 LBS | OXYGEN SATURATION: 97 % | HEIGHT: 63 IN | RESPIRATION RATE: 16 BRPM | HEART RATE: 73 BPM | BODY MASS INDEX: 35.97 KG/M2 | DIASTOLIC BLOOD PRESSURE: 86 MMHG

## 2024-07-28 PROBLEM — Z85.528 PERSONAL HISTORY OF RENAL CELL CARCINOMA: Status: ACTIVE | Noted: 2023-05-05

## 2024-07-28 PROBLEM — B02.29 NEURALGIA, POST-HERPETIC: Status: ACTIVE | Noted: 2024-07-28

## 2024-07-28 PROCEDURE — 96372 THER/PROPH/DIAG INJ SC/IM: CPT

## 2024-07-28 PROCEDURE — 6360000002 HC RX W HCPCS: Performed by: INTERNAL MEDICINE

## 2024-07-28 PROCEDURE — 6370000000 HC RX 637 (ALT 250 FOR IP): Performed by: NURSE PRACTITIONER

## 2024-07-28 PROCEDURE — 94761 N-INVAS EAR/PLS OXIMETRY MLT: CPT

## 2024-07-28 PROCEDURE — 2580000003 HC RX 258: Performed by: INTERNAL MEDICINE

## 2024-07-28 PROCEDURE — 6370000000 HC RX 637 (ALT 250 FOR IP): Performed by: INTERNAL MEDICINE

## 2024-07-28 PROCEDURE — G0378 HOSPITAL OBSERVATION PER HR: HCPCS

## 2024-07-28 PROCEDURE — 96376 TX/PRO/DX INJ SAME DRUG ADON: CPT

## 2024-07-28 RX ORDER — VALACYCLOVIR HYDROCHLORIDE 1 G/1
1000 TABLET, FILM COATED ORAL 3 TIMES DAILY
Qty: 10 TABLET | Refills: 0 | Status: SHIPPED | OUTPATIENT
Start: 2024-07-28 | End: 2024-08-01

## 2024-07-28 RX ORDER — OXYCODONE HYDROCHLORIDE 10 MG/1
10 TABLET ORAL EVERY 6 HOURS PRN
Qty: 30 TABLET | Refills: 0 | Status: SHIPPED | OUTPATIENT
Start: 2024-07-28 | End: 2024-08-05

## 2024-07-28 RX ORDER — IODINE/SODIUM IODIDE 2 %
TINCTURE TOPICAL PRN
Status: DISCONTINUED | OUTPATIENT
Start: 2024-07-28 | End: 2024-07-28 | Stop reason: HOSPADM

## 2024-07-28 RX ORDER — GABAPENTIN 300 MG/1
300 CAPSULE ORAL 3 TIMES DAILY
Qty: 90 CAPSULE | Refills: 0 | Status: SHIPPED | OUTPATIENT
Start: 2024-07-28 | End: 2024-08-27

## 2024-07-28 RX ADMIN — FERRIC OXIDE RED: 8; 8 LOTION TOPICAL at 01:48

## 2024-07-28 RX ADMIN — LOSARTAN POTASSIUM 50 MG: 50 TABLET, FILM COATED ORAL at 08:05

## 2024-07-28 RX ADMIN — GABAPENTIN 300 MG: 300 CAPSULE ORAL at 08:05

## 2024-07-28 RX ADMIN — ENOXAPARIN SODIUM 40 MG: 100 INJECTION SUBCUTANEOUS at 08:05

## 2024-07-28 RX ADMIN — KETOROLAC TROMETHAMINE 15 MG: 15 INJECTION, SOLUTION INTRAMUSCULAR; INTRAVENOUS at 05:25

## 2024-07-28 RX ADMIN — SODIUM CHLORIDE, PRESERVATIVE FREE 10 ML: 5 INJECTION INTRAVENOUS at 08:05

## 2024-07-28 RX ADMIN — VALACYCLOVIR HYDROCHLORIDE 1000 MG: 500 TABLET, FILM COATED ORAL at 08:04

## 2024-07-28 RX ADMIN — KETOROLAC TROMETHAMINE 15 MG: 15 INJECTION, SOLUTION INTRAMUSCULAR; INTRAVENOUS at 10:13

## 2024-07-28 RX ADMIN — OXYCODONE 10 MG: 5 TABLET ORAL at 01:17

## 2024-07-28 ASSESSMENT — PAIN SCALES - GENERAL
PAINLEVEL_OUTOF10: 0
PAINLEVEL_OUTOF10: 5
PAINLEVEL_OUTOF10: 4

## 2024-07-28 ASSESSMENT — PAIN DESCRIPTION - DESCRIPTORS
DESCRIPTORS: SHOOTING
DESCRIPTORS: ACHING

## 2024-07-28 ASSESSMENT — PAIN - FUNCTIONAL ASSESSMENT: PAIN_FUNCTIONAL_ASSESSMENT: ACTIVITIES ARE NOT PREVENTED

## 2024-07-28 ASSESSMENT — PAIN DESCRIPTION - LOCATION
LOCATION: FACE
LOCATION: EAR

## 2024-07-28 ASSESSMENT — PAIN DESCRIPTION - ORIENTATION
ORIENTATION: RIGHT
ORIENTATION: ANTERIOR

## 2024-07-28 NOTE — PLAN OF CARE
Problem: Neurosensory - Adult  Goal: Achieves stable or improved neurological status  Outcome: Progressing  Flowsheets (Taken 7/27/2024 2046)  Achieves stable or improved neurological status: Assess for and report changes in neurological status     Problem: Skin/Tissue Integrity - Adult  Goal: Skin integrity remains intact  Outcome: Progressing  Flowsheets  Taken 7/27/2024 2051  Skin Integrity Remains Intact: Monitor for areas of redness and/or skin breakdown  Taken 7/27/2024 2046  Skin Integrity Remains Intact: Monitor for areas of redness and/or skin breakdown     Problem: Gastrointestinal - Adult  Goal: Maintains or returns to baseline bowel function  Outcome: Progressing  Flowsheets (Taken 7/27/2024 2046)  Maintains or returns to baseline bowel function: Assess bowel function  Goal: Maintains adequate nutritional intake  Outcome: Progressing  Flowsheets (Taken 7/27/2024 2046)  Maintains adequate nutritional intake: Monitor percentage of each meal consumed

## 2024-07-28 NOTE — PROGRESS NOTES
7/28/2024            To Whom It May Concern:     RE: Namita Renteria (1975)    Dear Sir/:  This is to certify that the above referenced patient was hospitalized at [facility].   She is being discharged from the hospital 7/28/2024.       Due to medical reasons, Namita Renteria is instructed to remain off of work through 07/29/22024   She may return to work on 07/30/2024  with no restrictions.      Sincerely,    Pawan Samayoa MD MPH FACP [facility]   Director, Hospital Medicine   [facility].

## 2024-07-28 NOTE — DISCHARGE SUMMARY
Physician Discharge Summary           Pt Name  Namita Renteria   Admit date:  7/26/2024   Discharge date and time:  7/28/24 11:58 AM    Room Number  525/01    Medical Record Number  [id number] @ [facility]   Age  49 y.o.   Date of Birth 1975   PCP Lisa Heller, VALERIE - NP     Admission Diagnoses:                        Neuralgia, post-herpetic   Present on Admission:   Headache   HTN (hypertension)   Personal history of renal cell carcinoma   Neuralgia, post-herpetic       Allergies   Allergen Reactions    Metformin Diarrhea        Excerpt from HPI : \" Namita Renteria is a 49 y.o.  female with history of diabetes mellitus, hypertension, and a very remote episode of shingles when he was she was a teenager presenting with acute onset of headache.  Patient states that she was seen in the emergency room a couple get days back for an abscess of her scalp.  She was started on antibiotics.  The abscess is healing.  She developed shooting pains into the outside of her head, the right side, and noticed an enlarged lymph node behind her right ear.  In the ER her symptoms are uncontrolled.  Neurology was consulted.  She is being admitted to the hospitalist service due to concern for possible neuralgia.  \"        Hospital Course: This pt was admitted with  Neuralgia, post-herpetic on 7/26/2024.    Present on Admission:   Headache   HTN (hypertension)   Personal history of renal cell carcinoma   Neuralgia, post-herpetic      The pt was started on symptomatic Rx for neuralgic pain, with IV NSAID, PO Gabapentin.  Her sx improved. But on 07/27/24 she developed new lesion on the medial aspect of her RIGHT upper eyelid.   We started her on PO Valtrex therapeutic dose.   Today the new lesion is improving. Pt has tolerated the medications well.   We will discharge her with instruction for her to remain off of work for one day.   She is instructed to return to ER if she notices signs of vision problem, or other

## 2024-07-28 NOTE — PROGRESS NOTES
1135 Pt contacted son regarding d/c transportation. Son has not responded, pt will continue to reach out. This is only mode of transport at this time.

## 2024-09-03 ENCOUNTER — TELEPHONE (OUTPATIENT)
Age: 49
End: 2024-09-03

## 2024-09-03 NOTE — TELEPHONE ENCOUNTER
[facility] [place] at [facility]  [phone]    Phone call placed to pt to remind pt to have  to her imaging scheduled prior to follow up appointment with . Pt verbalized understanding.

## 2024-09-09 ENCOUNTER — TELEPHONE (OUTPATIENT)
Age: 49
End: 2024-09-09

## 2024-09-09 DIAGNOSIS — R91.1 PULMONARY NODULE SEEN ON IMAGING STUDY: Primary | ICD-10-CM

## 2024-09-09 DIAGNOSIS — Z85.528 PERSONAL HISTORY OF RENAL CELL CARCINOMA: ICD-10-CM

## 2024-09-10 ENCOUNTER — TELEPHONE (OUTPATIENT)
Age: 49
End: 2024-09-10

## 2024-10-23 ENCOUNTER — TELEPHONE (OUTPATIENT)
Age: 49
End: 2024-10-23

## 2024-10-23 NOTE — TELEPHONE ENCOUNTER
[facility] [place] at [facility]  [phone]    10/23/24- Chart review. Phone call placed to [facility] spoke to staff pt did not go to 9/13/24 appointment for CT. No future appointment scheduled.

## 2024-12-02 ENCOUNTER — TELEPHONE (OUTPATIENT)
Age: 49
End: 2024-12-02

## 2024-12-02 NOTE — TELEPHONE ENCOUNTER
Called patient to see if she wanted to reschedule the appt that she canceled. Patient stated that she had to move in her her mother and that she would not be sure when she will be able to take time off of work since they are busy there as well. Patient stated that she will have to call back when she has some time off and she will be able to make the appt.

## 2025-05-06 ENCOUNTER — TRANSCRIBE ORDERS (OUTPATIENT)
Facility: HOSPITAL | Age: 50
End: 2025-05-06

## 2025-05-06 DIAGNOSIS — G93.9 BRAIN LESION: Primary | ICD-10-CM
